# Patient Record
Sex: FEMALE | Race: WHITE | Employment: OTHER | URBAN - METROPOLITAN AREA
[De-identification: names, ages, dates, MRNs, and addresses within clinical notes are randomized per-mention and may not be internally consistent; named-entity substitution may affect disease eponyms.]

---

## 2017-06-05 ENCOUNTER — GENERIC CONVERSION - ENCOUNTER (OUTPATIENT)
Dept: OTHER | Facility: OTHER | Age: 62
End: 2017-06-05

## 2017-07-20 ENCOUNTER — GENERIC CONVERSION - ENCOUNTER (OUTPATIENT)
Dept: OTHER | Facility: OTHER | Age: 62
End: 2017-07-20

## 2017-08-09 ENCOUNTER — ALLSCRIPTS OFFICE VISIT (OUTPATIENT)
Dept: OTHER | Facility: OTHER | Age: 62
End: 2017-08-09

## 2018-01-14 VITALS
SYSTOLIC BLOOD PRESSURE: 142 MMHG | TEMPERATURE: 98.2 F | HEIGHT: 67 IN | WEIGHT: 262 LBS | DIASTOLIC BLOOD PRESSURE: 82 MMHG | HEART RATE: 80 BPM | RESPIRATION RATE: 18 BRPM | BODY MASS INDEX: 41.12 KG/M2

## 2018-05-26 ENCOUNTER — OFFICE VISIT (OUTPATIENT)
Dept: URGENT CARE | Facility: CLINIC | Age: 63
End: 2018-05-26
Payer: COMMERCIAL

## 2018-05-26 VITALS
SYSTOLIC BLOOD PRESSURE: 135 MMHG | OXYGEN SATURATION: 96 % | BODY MASS INDEX: 39.7 KG/M2 | HEART RATE: 78 BPM | WEIGHT: 247 LBS | HEIGHT: 66 IN | DIASTOLIC BLOOD PRESSURE: 79 MMHG | RESPIRATION RATE: 16 BRPM | TEMPERATURE: 100 F

## 2018-05-26 DIAGNOSIS — J20.9 ACUTE BRONCHITIS, UNSPECIFIED ORGANISM: Primary | ICD-10-CM

## 2018-05-26 PROCEDURE — 99283 EMERGENCY DEPT VISIT LOW MDM: CPT | Performed by: PREVENTIVE MEDICINE

## 2018-05-26 PROCEDURE — G0382 LEV 3 HOSP TYPE B ED VISIT: HCPCS | Performed by: PREVENTIVE MEDICINE

## 2018-05-26 RX ORDER — ATENOLOL 50 MG/1
50 TABLET ORAL
COMMUNITY

## 2018-05-26 RX ORDER — PROMETHAZINE HYDROCHLORIDE AND CODEINE PHOSPHATE 6.25; 1 MG/5ML; MG/5ML
5 SYRUP ORAL EVERY 4 HOURS PRN
Qty: 120 ML | Refills: 0 | Status: SHIPPED | OUTPATIENT
Start: 2018-05-26

## 2018-05-26 RX ORDER — DOXYCYCLINE 100 MG/1
100 TABLET ORAL 2 TIMES DAILY
Qty: 10 TABLET | Refills: 0 | Status: SHIPPED | OUTPATIENT
Start: 2018-05-26 | End: 2018-05-31

## 2018-05-26 RX ORDER — NICOTINE POLACRILEX 2 MG
GUM BUCCAL
COMMUNITY

## 2018-05-26 RX ORDER — OMEPRAZOLE 20 MG/1
CAPSULE, DELAYED RELEASE ORAL
COMMUNITY
Start: 2017-04-02

## 2018-05-26 RX ORDER — CYCLOBENZAPRINE HCL 10 MG
TABLET ORAL
COMMUNITY
Start: 2017-02-22

## 2018-05-26 RX ORDER — CETIRIZINE HYDROCHLORIDE 10 MG/1
TABLET ORAL
COMMUNITY
Start: 2017-02-23

## 2018-05-26 RX ORDER — ALBUTEROL SULFATE 90 UG/1
AEROSOL, METERED RESPIRATORY (INHALATION)
COMMUNITY
Start: 2017-02-23

## 2018-05-26 RX ORDER — ALBUTEROL SULFATE 2.5 MG/3ML
SOLUTION RESPIRATORY (INHALATION)
COMMUNITY
Start: 2017-02-22 | End: 2019-08-25 | Stop reason: SDUPTHER

## 2018-05-26 RX ORDER — LEVOTHYROXINE SODIUM 0.2 MG/1
TABLET ORAL
COMMUNITY
Start: 2017-02-20

## 2018-05-26 RX ORDER — PREGABALIN 100 MG/1
CAPSULE ORAL 3 TIMES DAILY
COMMUNITY
Start: 2017-03-11

## 2018-05-26 RX ORDER — AMITRIPTYLINE HYDROCHLORIDE 50 MG/1
TABLET, FILM COATED ORAL
COMMUNITY
Start: 2017-02-27

## 2018-05-26 RX ORDER — PROCHLORPERAZINE MALEATE 10 MG
TABLET ORAL
COMMUNITY
Start: 2017-05-17

## 2018-05-26 RX ORDER — EZETIMIBE AND SIMVASTATIN 10; 40 MG/1; MG/1
TABLET ORAL
COMMUNITY
Start: 2017-03-21

## 2018-05-26 RX ORDER — FLUTICASONE PROPIONATE 220 UG/1
AEROSOL, METERED RESPIRATORY (INHALATION)
COMMUNITY
Start: 2017-08-02

## 2018-05-26 RX ORDER — FLUTICASONE PROPIONATE 50 MCG
SPRAY, SUSPENSION (ML) NASAL
COMMUNITY
Start: 2017-05-11

## 2018-05-26 RX ORDER — ATENOLOL AND CHLORTHALIDONE TABLET 50; 25 MG/1; MG/1
TABLET ORAL
COMMUNITY
Start: 2017-04-02

## 2018-05-26 RX ORDER — SUMATRIPTAN 100 MG/1
TABLET, FILM COATED ORAL
COMMUNITY
Start: 2017-04-11

## 2018-05-26 NOTE — PATIENT INSTRUCTIONS
Increase fluids  Codeine cough medicine as needed to suppress harsh cough    If you're not improving in the next 2-4 days you must be recheck

## 2018-05-26 NOTE — PROGRESS NOTES
[facility]        NAME: Elba Alcantar is a 63 y o  female  : 1955    MRN: [id number]  DATE: May 26, 2018  TIME: 4:00 PM    Assessment and Plan   Acute bronchitis, unspecified organism [J20 9]  1  Acute bronchitis, unspecified organism           Patient Instructions       Follow up with PCP in 3-5 days  Proceed to  ER if symptoms worsen  Chief Complaint     Chief Complaint   Patient presents with    Cold Like Symptoms     Started  with sneezing, coughing, post nasal drip  Saw PCP on wednesday, not given anything  Now c/o chest tightness, wheezing, thick green phlegm with cough  Hx)asthma         History of Present Illness       Harsh cough, congestion, low-grade fever  Review of Systems   Review of Systems   Constitutional: Positive for fever  HENT: Positive for congestion  Respiratory: Positive for cough            Current Medications       Current Outpatient Prescriptions:     albuterol (2 5 mg/3 mL) 0 083 % nebulizer solution, Inhale, Disp: , Rfl:     albuterol (VENTOLIN HFA) 90 mcg/act inhaler, Inhale, Disp: , Rfl:     amitriptyline (ELAVIL) 50 mg tablet, Take by mouth, Disp: , Rfl:     atenolol-chlorthalidone (TENORETIC) 50-25 mg per tablet, Take by mouth, Disp: , Rfl:     cetirizine (ZyrTEC) 10 mg tablet, Take by mouth, Disp: , Rfl:     Cholecalciferol (VITAMIN D3) 1000 UNIT/SPRAY LIQD, Take by mouth, Disp: , Rfl:     cyclobenzaprine (FLEXERIL) 10 mg tablet, Take by mouth, Disp: , Rfl:     ezetimibe-simvastatin (VYTORIN) 10-40 mg per tablet, Take by mouth, Disp: , Rfl:     fluticasone (FLONASE) 50 mcg/act nasal spray, into each nostril, Disp: , Rfl:     fluticasone (FLOVENT HFA) 220 mcg/act inhaler, Inhale, Disp: , Rfl:     levothyroxine 200 mcg tablet, Take by mouth, Disp: , Rfl:     omeprazole (PriLOSEC) 20 mg delayed release capsule, Take by mouth, Disp: , Rfl:     pregabalin (LYRICA) 100 mg capsule, Take by mouth, Disp: , Rfl:     prochlorperazine (COMPAZINE) 10 mg tablet, Take by mouth, Disp: , Rfl:     SUMAtriptan (IMITREX) 100 mg tablet, Take by mouth, Disp: , Rfl:     atenolol (TENORMIN) 50 mg tablet, Take 50 mg by mouth, Disp: , Rfl:     Biotin 1 MG CAPS, Take by mouth, Disp: , Rfl:     Current Allergies     Allergies as of 05/26/2018 - Reviewed 05/26/2018   Allergen Reaction Noted    Seasonal ic [cholestatin]  05/26/2018            The following portions of the patient's history were reviewed and updated as appropriate: allergies, current medications, past family history, past medical history, past social history, past surgical history and problem list      No past medical history on file  No past surgical history on file  No family history on file  Medications have been verified  Objective   /79   Pulse 78   Temp 100 °F (37 8 °C)   Resp 16   Ht 5' 6" (1 676 m)   Wt 112 kg (247 lb)   SpO2 96%   BMI 39 87 kg/m²        Physical Exam     Physical Exam   HENT:   Right Ear: External ear normal    Left Ear: External ear normal    Mouth/Throat: Oropharynx is clear and moist    Cardiovascular: Normal heart sounds  Pulmonary/Chest: Breath sounds normal  No respiratory distress  She has no wheezes  She has no rales  Lymphadenopathy:     She has no cervical adenopathy

## 2018-05-29 ENCOUNTER — OFFICE VISIT (OUTPATIENT)
Dept: URGENT CARE | Facility: CLINIC | Age: 63
End: 2018-05-29
Payer: COMMERCIAL

## 2018-05-29 VITALS
DIASTOLIC BLOOD PRESSURE: 86 MMHG | BODY MASS INDEX: 39.21 KG/M2 | TEMPERATURE: 98.6 F | SYSTOLIC BLOOD PRESSURE: 140 MMHG | HEART RATE: 85 BPM | RESPIRATION RATE: 16 BRPM | WEIGHT: 244 LBS | HEIGHT: 66 IN

## 2018-05-29 DIAGNOSIS — N30.01 ACUTE CYSTITIS WITH HEMATURIA: Primary | ICD-10-CM

## 2018-05-29 DIAGNOSIS — R05.9 COUGH: ICD-10-CM

## 2018-05-29 PROCEDURE — 99283 EMERGENCY DEPT VISIT LOW MDM: CPT | Performed by: PREVENTIVE MEDICINE

## 2018-05-29 PROCEDURE — G0382 LEV 3 HOSP TYPE B ED VISIT: HCPCS | Performed by: PREVENTIVE MEDICINE

## 2018-05-29 RX ORDER — PROMETHAZINE HYDROCHLORIDE AND CODEINE PHOSPHATE 6.25; 1 MG/5ML; MG/5ML
5 SYRUP ORAL EVERY 4 HOURS PRN
Qty: 120 ML | Refills: 0 | Status: SHIPPED | OUTPATIENT
Start: 2018-05-29 | End: 2018-06-03

## 2018-05-29 RX ORDER — CIPROFLOXACIN 250 MG/1
500 TABLET, FILM COATED ORAL EVERY 12 HOURS SCHEDULED
Qty: 10 TABLET | Refills: 0 | Status: SHIPPED | OUTPATIENT
Start: 2018-05-29 | End: 2018-06-03

## 2018-05-29 NOTE — PROGRESS NOTES
[facility]        NAME: Karen Sewell is a 63 y o  female  : 1955    MRN: [id number]  DATE: May 29, 2018  TIME: 5:47 PM    Assessment and Plan   Acute cystitis with hematuria [N30 01]  1  Acute cystitis with hematuria  ciprofloxacin (CIPRO) 250 mg tablet   2  Cough  promethazine-codeine (PHENERGAN WITH CODEINE) 6 25-10 mg/5 mL syrup         Patient Instructions       Follow up with PCP in 3-5 days  Proceed to  ER if symptoms worsen  Chief Complaint     Chief Complaint   Patient presents with    Urinary Frequency     pt has urinary frequency, urgency, and burning sensation since yesterday  pt had uti recently  pt still taking doxycycline from saturday's visit O2          History of Present Illness       Burning and frequency of urination times 2-3 days  Note history of several incidences of UTIs in the past   Also, continuing increased cough particularly at night  Review of Systems   Review of Systems   Genitourinary: Positive for dysuria, frequency and hematuria           Current Medications       Current Outpatient Prescriptions:     albuterol (2 5 mg/3 mL) 0 083 % nebulizer solution, Inhale, Disp: , Rfl:     albuterol (VENTOLIN HFA) 90 mcg/act inhaler, Inhale, Disp: , Rfl:     amitriptyline (ELAVIL) 50 mg tablet, Take by mouth, Disp: , Rfl:     atenolol-chlorthalidone (TENORETIC) 50-25 mg per tablet, Take by mouth, Disp: , Rfl:     Biotin 1 MG CAPS, Take by mouth, Disp: , Rfl:     cetirizine (ZyrTEC) 10 mg tablet, Take by mouth, Disp: , Rfl:     Cholecalciferol (VITAMIN D3) 1000 UNIT/SPRAY LIQD, Take by mouth, Disp: , Rfl:     cyclobenzaprine (FLEXERIL) 10 mg tablet, Take by mouth, Disp: , Rfl:     doxycycline (ADOXA) 100 MG tablet, Take 1 tablet (100 mg total) by mouth 2 (two) times a day for 5 days, Disp: 10 tablet, Rfl: 0    ezetimibe-simvastatin (VYTORIN) 10-40 mg per tablet, Take by mouth, Disp: , Rfl:     fluticasone (FLONASE) 50 mcg/act nasal spray, into each nostril, Disp: , Rfl:     fluticasone (FLOVENT HFA) 220 mcg/act inhaler, Inhale, Disp: , Rfl:     levothyroxine 200 mcg tablet, Take by mouth, Disp: , Rfl:     omeprazole (PriLOSEC) 20 mg delayed release capsule, Take by mouth, Disp: , Rfl:     pregabalin (LYRICA) 100 mg capsule, Take by mouth, Disp: , Rfl:     prochlorperazine (COMPAZINE) 10 mg tablet, Take by mouth, Disp: , Rfl:     promethazine-codeine (PHENERGAN WITH CODEINE) 6 25-10 mg/5 mL syrup, Take 5 mL by mouth every 4 (four) hours as needed for cough, Disp: 120 mL, Rfl: 0    SUMAtriptan (IMITREX) 100 mg tablet, Take by mouth, Disp: , Rfl:     atenolol (TENORMIN) 50 mg tablet, Take 50 mg by mouth, Disp: , Rfl:     ciprofloxacin (CIPRO) 250 mg tablet, Take 2 tablets (500 mg total) by mouth every 12 (twelve) hours for 5 days, Disp: 10 tablet, Rfl: 0    promethazine-codeine (PHENERGAN WITH CODEINE) 6 25-10 mg/5 mL syrup, Take 5 mL by mouth every 4 (four) hours as needed for cough for up to 5 days, Disp: 120 mL, Rfl: 0    Current Allergies     Allergies as of 05/29/2018 - Reviewed 05/29/2018   Allergen Reaction Noted    Seasonal ic [cholestatin]  05/26/2018            The following portions of the patient's history were reviewed and updated as appropriate: allergies, current medications, past family history, past medical history, past social history, past surgical history and problem list      Past Medical History:   Diagnosis Date    Acid reflux     Asthma     Back pain     Hypertension     Hypothyroidism     Nerve pain     Seasonal allergies        History reviewed  No pertinent surgical history  No family history on file  Medications have been verified          Objective   /86   Pulse 85   Temp 98 6 °F (37 °C)   Resp 16   Ht 5' 6" (1 676 m)   Wt 111 kg (244 lb)   BMI 39 38 kg/m²        Physical Exam     Physical Exam   Genitourinary:   Genitourinary Comments: No CVA tenderness to percussion     Urinalysis shows large amounts of leukocytes and red cells

## 2018-05-29 NOTE — PATIENT INSTRUCTIONS
Call on Thursday for the results of the urine culture  If he developed fever and back pain you must be recheck immediately    If you're getting persistent repeated urinary infections you should see a urologist

## 2018-06-01 LAB
BACTERIA UR CULT: ABNORMAL
Lab: ABNORMAL
SL AMB ANTIMICROBIAL SUSCEPTIBILITY: ABNORMAL

## 2019-02-26 ENCOUNTER — OFFICE VISIT (OUTPATIENT)
Dept: URGENT CARE | Facility: CLINIC | Age: 64
End: 2019-02-26
Payer: COMMERCIAL

## 2019-02-26 VITALS
TEMPERATURE: 100.1 F | BODY MASS INDEX: 39.53 KG/M2 | RESPIRATION RATE: 16 BRPM | HEIGHT: 66 IN | SYSTOLIC BLOOD PRESSURE: 152 MMHG | DIASTOLIC BLOOD PRESSURE: 88 MMHG | OXYGEN SATURATION: 97 % | WEIGHT: 246 LBS | HEART RATE: 88 BPM

## 2019-02-26 DIAGNOSIS — J40 BRONCHITIS: Primary | ICD-10-CM

## 2019-02-26 PROCEDURE — G0382 LEV 3 HOSP TYPE B ED VISIT: HCPCS | Performed by: PHYSICIAN ASSISTANT

## 2019-02-26 PROCEDURE — 99283 EMERGENCY DEPT VISIT LOW MDM: CPT | Performed by: PHYSICIAN ASSISTANT

## 2019-02-26 RX ORDER — BIOTIN 5 MG
TABLET ORAL
COMMUNITY

## 2019-02-26 RX ORDER — AZITHROMYCIN 250 MG/1
TABLET, FILM COATED ORAL
Qty: 6 TABLET | Refills: 0 | Status: SHIPPED | OUTPATIENT
Start: 2019-02-26 | End: 2019-03-03

## 2019-02-26 RX ORDER — ALBUTEROL SULFATE 2.5 MG/3ML
2.5 SOLUTION RESPIRATORY (INHALATION) EVERY 6 HOURS PRN
Qty: 15 VIAL | Refills: 0 | Status: SHIPPED | OUTPATIENT
Start: 2019-02-26

## 2019-02-27 NOTE — PATIENT INSTRUCTIONS
Take azithromycin as directed  Continue over-the-counter cough medications as needed  Tylenol for fever  Increased fluids and rest  If no improvement in 3-4 days follow-up with PCP  If anything changes or worsens follow-up sooner or go the ER

## 2019-02-27 NOTE — PROGRESS NOTES
NAME: Kristine Cruz is a 66 y o  female  : 1955    MRN: [id number]      Assessment and Plan   Bronchitis [J40]  1  Bronchitis  azithromycin (ZITHROMAX) 250 mg tablet    dextromethorphan-guaifenesin (MUCINEX DM)  MG per 12 hr tablet    albuterol (2 5 mg/3 mL) 0 083 % nebulizer solution           Patient Instructions   Patient Instructions   Take azithromycin as directed  Continue over-the-counter cough medications as needed  Tylenol for fever  Increased fluids and rest  If no improvement in 3-4 days follow-up with PCP  If anything changes or worsens follow-up sooner or go the ER    Proceed to ER if symptoms worsen  Chief Complaint     Chief Complaint   Patient presents with    Cold Like Symptoms     2 weeks, fever         History of Present Illness   Patient with past medical history of hypertension and COPD Complaining of 2 week history of cough and congestion  She started 2 weeks ago with upper respiratory infection and states worsening since  She states her sputum has now turned to a thick consistency and she complains of chest tightness  Denies shortness of breath, dyspnea, chest pain  She states she also has some sinus pain and pressure and runny nose  Reports she has low-grade fevers now T-max 100 1°  She states she has been taking cold and flu medication and Zyrtec without any relief  Review of Systems   Review of Systems   Constitutional: Positive for fever  Negative for chills  HENT: Positive for congestion, rhinorrhea, sinus pressure and sinus pain  Negative for ear pain and sore throat  Respiratory: Positive for cough, chest tightness and wheezing  Negative for shortness of breath and stridor  Cardiovascular: Negative for chest pain and palpitations           Current Medications       Current Outpatient Medications:     albuterol (VENTOLIN HFA) 90 mcg/act inhaler, Inhale, Disp: , Rfl:     atenolol (TENORMIN) 50 mg tablet, Take 50 mg by mouth, Disp: , Rfl:     Biotin 1 MG CAPS, Take by mouth, Disp: , Rfl:     cetirizine (ZyrTEC) 10 mg tablet, Take by mouth, Disp: , Rfl:     Cholecalciferol (VITAMIN D3) 1000 UNIT/SPRAY LIQD, Take by mouth, Disp: , Rfl:     cyclobenzaprine (FLEXERIL) 10 mg tablet, Take by mouth, Disp: , Rfl:     ezetimibe-simvastatin (VYTORIN) 10-40 mg per tablet, Take by mouth, Disp: , Rfl:     fluticasone (FLONASE) 50 mcg/act nasal spray, into each nostril, Disp: , Rfl:     fluticasone (FLOVENT HFA) 220 mcg/act inhaler, Inhale, Disp: , Rfl:     omeprazole (PriLOSEC) 20 mg delayed release capsule, Take by mouth, Disp: , Rfl:     prochlorperazine (COMPAZINE) 10 mg tablet, Take by mouth, Disp: , Rfl:     SUMAtriptan (IMITREX) 100 mg tablet, Take by mouth, Disp: , Rfl:     albuterol (2 5 mg/3 mL) 0 083 % nebulizer solution, Inhale, Disp: , Rfl:     albuterol (2 5 mg/3 mL) 0 083 % nebulizer solution, Take 1 vial (2 5 mg total) by nebulization every 6 (six) hours as needed for wheezing or shortness of breath, Disp: 15 vial, Rfl: 0    ALBUTEROL IN, Inhale 2 puffs every 6 (six) hours as needed, Disp: , Rfl:     amitriptyline (ELAVIL) 50 mg tablet, Take by mouth, Disp: , Rfl:     atenolol-chlorthalidone (TENORETIC) 50-25 mg per tablet, Take by mouth, Disp: , Rfl:     azithromycin (ZITHROMAX) 250 mg tablet, Take 2 tablets today then 1 tablet daily x 4 days, Disp: 6 tablet, Rfl: 0    Biotin 5 MG TABS, Take by mouth, Disp: , Rfl:     dextromethorphan-guaifenesin (MUCINEX DM)  MG per 12 hr tablet, Take 1 tablet by mouth every 12 (twelve) hours, Disp: 12 tablet, Rfl: 0    levothyroxine 200 mcg tablet, Take by mouth, Disp: , Rfl:     pregabalin (LYRICA) 100 mg capsule, Take by mouth, Disp: , Rfl:     promethazine-codeine (PHENERGAN WITH CODEINE) 6 25-10 mg/5 mL syrup, Take 5 mL by mouth every 4 (four) hours as needed for cough (Patient not taking: Reported on 2/26/2019), Disp: 120 mL, Rfl: 0    Current Allergies     Allergies as of 02/26/2019 - Reviewed 02/26/2019   Allergen Reaction Noted    Seasonal ic [cholestatin]  05/26/2018              Past Medical History:   Diagnosis Date    Acid reflux     Asthma     Back pain     Hypertension     Hypothyroidism     Nerve pain     Seasonal allergies        History reviewed  No pertinent surgical history  History reviewed  No pertinent family history  Medications have been verified  The following portions of the patient's history were reviewed and updated as appropriate: allergies, current medications, past family history, past medical history, past social history, past surgical history and problem list     Objective   /88   Pulse 88   Temp 100 1 °F (37 8 °C)   Resp 16   Ht 5' 6" (1 676 m)   Wt 112 kg (246 lb)   SpO2 97%   BMI 39 71 kg/m²      Physical Exam     Physical Exam   Constitutional: She appears well-developed and well-nourished  No distress  HENT:   TMs clear bilaterally without erythema or bulging  Nasal mucosa without erythema or edema  Oropharynx is clear without erythema, edema, exudates or tonsillar hypertrophy  +PND   Cardiovascular: Normal rate, regular rhythm and normal heart sounds  Pulmonary/Chest: Effort normal  No stridor  No respiratory distress  She has wheezes (Fine bilateral expiratory wheezes to the upper lung lobes  No rhonchi or rales  )  She has no rales  Lymphadenopathy:     She has no cervical adenopathy  Skin: She is not diaphoretic

## 2019-08-15 ENCOUNTER — OFFICE VISIT (OUTPATIENT)
Dept: URGENT CARE | Facility: CLINIC | Age: 64
End: 2019-08-15
Payer: COMMERCIAL

## 2019-08-15 VITALS
RESPIRATION RATE: 16 BRPM | SYSTOLIC BLOOD PRESSURE: 130 MMHG | BODY MASS INDEX: 41.65 KG/M2 | DIASTOLIC BLOOD PRESSURE: 70 MMHG | HEIGHT: 65 IN | HEART RATE: 86 BPM | TEMPERATURE: 102.5 F | WEIGHT: 250 LBS

## 2019-08-15 DIAGNOSIS — J45.21 MILD INTERMITTENT ASTHMA WITH EXACERBATION: Primary | ICD-10-CM

## 2019-08-15 DIAGNOSIS — J30.1 ALLERGIC RHINITIS DUE TO POLLEN, UNSPECIFIED SEASONALITY: ICD-10-CM

## 2019-08-15 PROCEDURE — 99283 EMERGENCY DEPT VISIT LOW MDM: CPT | Performed by: FAMILY MEDICINE

## 2019-08-15 PROCEDURE — 96372 THER/PROPH/DIAG INJ SC/IM: CPT | Performed by: FAMILY MEDICINE

## 2019-08-15 PROCEDURE — G0382 LEV 3 HOSP TYPE B ED VISIT: HCPCS | Performed by: FAMILY MEDICINE

## 2019-08-15 RX ORDER — DEXTROMETHORPHAN HYDROBROMIDE AND PROMETHAZINE HYDROCHLORIDE 15; 6.25 MG/5ML; MG/5ML
5 SOLUTION ORAL 4 TIMES DAILY PRN
Qty: 120 ML | Refills: 0 | Status: SHIPPED | OUTPATIENT
Start: 2019-08-15

## 2019-08-15 RX ORDER — AZITHROMYCIN 250 MG/1
TABLET, FILM COATED ORAL
Qty: 6 TABLET | Refills: 0 | Status: SHIPPED | OUTPATIENT
Start: 2019-08-15 | End: 2019-08-20

## 2019-08-15 RX ORDER — METHYLPREDNISOLONE SODIUM SUCCINATE 125 MG/2ML
125 INJECTION, POWDER, LYOPHILIZED, FOR SOLUTION INTRAMUSCULAR; INTRAVENOUS ONCE
Status: COMPLETED | OUTPATIENT
Start: 2019-08-15 | End: 2019-08-15

## 2019-08-15 RX ORDER — PREDNISONE 10 MG/1
TABLET ORAL
Qty: 10 TABLET | Refills: 0 | Status: SHIPPED | OUTPATIENT
Start: 2019-08-15

## 2019-08-15 RX ADMIN — METHYLPREDNISOLONE SODIUM SUCCINATE 125 MG: 125 INJECTION, POWDER, LYOPHILIZED, FOR SOLUTION INTRAMUSCULAR; INTRAVENOUS at 19:21

## 2019-08-15 NOTE — PROGRESS NOTES
[facility]        NAME: Rajwinder Johnson is a 66 y o  female  : 1955    MRN: [id number]  DATE: August 15, 2019  TIME: 7:30 PM    Assessment and Plan   Mild intermittent asthma with exacerbation [J45 21]  1  Mild intermittent asthma with exacerbation  methylPREDNISolone sodium succinate (Solu-MEDROL) injection 125 mg    predniSONE 10 mg tablet    Promethazine-DM (PHENERGAN-DM) 6 25-15 mg/5 mL oral syrup    azithromycin (ZITHROMAX) 250 mg tablet   2  Allergic rhinitis due to pollen, unspecified seasonality  predniSONE 10 mg tablet         Patient Instructions   Patient Instructions   Solu-Medrol 125 mg IM given in care now by nurse  Start brief prednisone taper tomorrow  Promethazine DM as needed for cough  Continue albuterol inhaler or nebulizer for cough  Azithromycin as directed  Follow-up PCP in 3-5 days        Proceed to  ER if symptoms worsen  Chief Complaint     Chief Complaint   Patient presents with    Cough     x4 days, cough, congestion, mucus turning green, O2-95%         History of Present Illness       Patient presents with 3 day history of sinus congestion and nonproductive cough which she feels is becoming worse  She does have a history of seasonal allergies and mild intermittent asthma for which she uses an albuterol inhaler  She does also use Flonase and Zyrtec  She denies chest tightness shortness of breath or wheezing  No fevers no chills prior to today, however she does have a temp of 102 5° now  She has been using her albuterol inhaler which helps for a short time, she also has a nebulizer at home  Review of Systems   Review of Systems   Constitutional: Negative  HENT: Positive for congestion and rhinorrhea  Negative for sneezing and sore throat  Eyes: Negative  Respiratory: Positive for cough  Negative for chest tightness, shortness of breath and wheezing  Cardiovascular: Negative  Musculoskeletal: Negative            Current Medications Current Outpatient Medications:     albuterol (2 5 mg/3 mL) 0 083 % nebulizer solution, Take 1 vial (2 5 mg total) by nebulization every 6 (six) hours as needed for wheezing or shortness of breath, Disp: 15 vial, Rfl: 0    albuterol (VENTOLIN HFA) 90 mcg/act inhaler, Inhale, Disp: , Rfl:     ALBUTEROL IN, Inhale 2 puffs every 6 (six) hours as needed, Disp: , Rfl:     atenolol-chlorthalidone (TENORETIC) 50-25 mg per tablet, Take by mouth, Disp: , Rfl:     Biotin 5 MG TABS, Take by mouth, Disp: , Rfl:     cetirizine (ZyrTEC) 10 mg tablet, Take by mouth, Disp: , Rfl:     Cholecalciferol (VITAMIN D3) 1000 UNIT/SPRAY LIQD, Take by mouth, Disp: , Rfl:     cyclobenzaprine (FLEXERIL) 10 mg tablet, Take by mouth, Disp: , Rfl:     ezetimibe-simvastatin (VYTORIN) 10-40 mg per tablet, Take by mouth, Disp: , Rfl:     fluticasone (FLONASE) 50 mcg/act nasal spray, into each nostril, Disp: , Rfl:     fluticasone (FLOVENT HFA) 220 mcg/act inhaler, Inhale, Disp: , Rfl:     omeprazole (PriLOSEC) 20 mg delayed release capsule, Take by mouth, Disp: , Rfl:     prochlorperazine (COMPAZINE) 10 mg tablet, Take by mouth, Disp: , Rfl:     SUMAtriptan (IMITREX) 100 mg tablet, Take by mouth, Disp: , Rfl:     albuterol (2 5 mg/3 mL) 0 083 % nebulizer solution, Inhale, Disp: , Rfl:     amitriptyline (ELAVIL) 50 mg tablet, Take by mouth, Disp: , Rfl:     atenolol (TENORMIN) 50 mg tablet, Take 50 mg by mouth, Disp: , Rfl:     azithromycin (ZITHROMAX) 250 mg tablet, 2 tablets today then 1 tablet for the next 4 days, Disp: 6 tablet, Rfl: 0    Biotin 1 MG CAPS, Take by mouth, Disp: , Rfl:     dextromethorphan-guaifenesin (MUCINEX DM)  MG per 12 hr tablet, Take 1 tablet by mouth every 12 (twelve) hours (Patient not taking: Reported on 8/15/2019), Disp: 12 tablet, Rfl: 0    levothyroxine 200 mcg tablet, Take by mouth, Disp: , Rfl:     predniSONE 10 mg tablet, Take 4 tablets starting 8/16, then 3 tablets, then 2 tablets, then 1 tablet, Disp: 10 tablet, Rfl: 0    pregabalin (LYRICA) 100 mg capsule, Take by mouth, Disp: , Rfl:     promethazine-codeine (PHENERGAN WITH CODEINE) 6 25-10 mg/5 mL syrup, Take 5 mL by mouth every 4 (four) hours as needed for cough (Patient not taking: Reported on 2/26/2019), Disp: 120 mL, Rfl: 0    Promethazine-DM (PHENERGAN-DM) 6 25-15 mg/5 mL oral syrup, Take 5 mL by mouth 4 (four) times a day as needed for cough, Disp: 120 mL, Rfl: 0  No current facility-administered medications for this visit  Current Allergies     Allergies as of 08/15/2019 - Reviewed 08/15/2019   Allergen Reaction Noted    Seasonal ic [cholestatin]  05/26/2018            The following portions of the patient's history were reviewed and updated as appropriate: allergies, current medications, past family history, past medical history, past social history, past surgical history and problem list      Past Medical History:   Diagnosis Date    Acid reflux     Asthma     Back pain     Hypertension     Hypothyroidism     Nerve pain     Seasonal allergies        History reviewed  No pertinent surgical history  No family history on file  Medications have been verified  Objective   /70   Pulse 86   Temp (!) 102 5 °F (39 2 °C)   Resp 16   Ht 5' 5" (1 651 m)   Wt 113 kg (250 lb)   BMI 41 60 kg/m²        Physical Exam     Physical Exam   Constitutional: She appears well-developed and well-nourished  HENT:   Right Ear: External ear normal    Left Ear: External ear normal    Mouth/Throat: No oropharyngeal exudate  Intranasal mucosal mild erythema edema and clear rhinorrhea, mild PND TM clear bilaterally   Eyes: Conjunctivae are normal    Neck: Neck supple  Cardiovascular: Normal rate, regular rhythm and normal heart sounds  Pulmonary/Chest: Effort normal and breath sounds normal  No respiratory distress  She has no wheezes  Lymphadenopathy:     She has no cervical adenopathy

## 2019-08-15 NOTE — PATIENT INSTRUCTIONS
Solu-Medrol 125 mg IM given in care now by nurse  Start brief prednisone taper tomorrow  Promethazine DM as needed for cough  Continue albuterol inhaler or nebulizer for cough  Azithromycin as directed  Follow-up PCP in 3-5 days

## 2019-08-16 ENCOUNTER — OFFICE VISIT (OUTPATIENT)
Dept: URGENT CARE | Facility: CLINIC | Age: 64
End: 2019-08-16
Payer: COMMERCIAL

## 2019-08-16 VITALS
DIASTOLIC BLOOD PRESSURE: 82 MMHG | WEIGHT: 250 LBS | HEIGHT: 65 IN | TEMPERATURE: 97.6 F | BODY MASS INDEX: 41.65 KG/M2 | SYSTOLIC BLOOD PRESSURE: 138 MMHG | OXYGEN SATURATION: 97 % | HEART RATE: 79 BPM | RESPIRATION RATE: 18 BRPM

## 2019-08-16 DIAGNOSIS — R31.9 URINARY TRACT INFECTION WITH HEMATURIA, SITE UNSPECIFIED: Primary | ICD-10-CM

## 2019-08-16 DIAGNOSIS — R30.0 BURNING WITH URINATION: ICD-10-CM

## 2019-08-16 DIAGNOSIS — N39.0 URINARY TRACT INFECTION WITH HEMATURIA, SITE UNSPECIFIED: Primary | ICD-10-CM

## 2019-08-16 LAB
SL AMB  POCT GLUCOSE, UA: NEGATIVE
SL AMB LEUKOCYTE ESTERASE,UA: ABNORMAL
SL AMB POCT BILIRUBIN,UA: NEGATIVE
SL AMB POCT BLOOD,UA: ABNORMAL
SL AMB POCT CLARITY,UA: ABNORMAL
SL AMB POCT COLOR,UA: ABNORMAL
SL AMB POCT KETONES,UA: NEGATIVE
SL AMB POCT NITRITE,UA: NEGATIVE
SL AMB POCT PH,UA: 6.5
SL AMB POCT SPECIFIC GRAVITY,UA: 1.02
SL AMB POCT URINE PROTEIN: 30
SL AMB POCT UROBILINOGEN: 1

## 2019-08-16 PROCEDURE — 81002 URINALYSIS NONAUTO W/O SCOPE: CPT | Performed by: PHYSICIAN ASSISTANT

## 2019-08-16 PROCEDURE — G0382 LEV 3 HOSP TYPE B ED VISIT: HCPCS | Performed by: PHYSICIAN ASSISTANT

## 2019-08-16 PROCEDURE — 99283 EMERGENCY DEPT VISIT LOW MDM: CPT | Performed by: PHYSICIAN ASSISTANT

## 2019-08-16 RX ORDER — NITROFURANTOIN 25; 75 MG/1; MG/1
100 CAPSULE ORAL 2 TIMES DAILY
Qty: 10 CAPSULE | Refills: 0 | Status: SHIPPED | OUTPATIENT
Start: 2019-08-16 | End: 2019-08-21

## 2019-08-16 NOTE — PROGRESS NOTES
NAME: Grady Burton is a 66 y o  female  : 1955    MRN: [id number]      Assessment and Plan   Urinary tract infection with hematuria, site unspecified [N39 0, R31 9]  1  Urinary tract infection with hematuria, site unspecified  nitrofurantoin (MACROBID) 100 mg capsule   2  Burning with urination  POCT urine dip    Urine culture       Dip positive for large leukocytes, 30 protein and small blood  Will empirically treat    Patient Instructions     Patient Instructions   Macrobid as directed  Increase fluids and rest  Call in 2 days for culture results   If no improvement in 3-4 days f/u with PCP   If anything changes or worsens, f/u sooner     Proceed to ER if symptoms worsen  Chief Complaint     Chief Complaint   Patient presents with    Possible UTI     burning with urination; started today         History of Present Illness   Patient presents complaining of 1 day history of UTI symptoms  States she was seen here last night for cough and congestion and reports she is feeling a lot better but woke up this morning with burning and frequency  She denies any fever, chills, nausea, vomiting, back pain or abdominal pain  States that it burns when she pees, has lot of pressure and she is going a lot  Reports that her urine has a bad odor as well  Review of Systems   Review of Systems   Constitutional: Negative for chills and fever  Gastrointestinal: Negative for abdominal pain, nausea and vomiting  Genitourinary: Positive for dysuria, frequency and urgency  Negative for flank pain, hematuria, vaginal bleeding and vaginal discharge  Musculoskeletal: Negative for back pain           Current Medications       Current Outpatient Medications:     albuterol (2 5 mg/3 mL) 0 083 % nebulizer solution, Inhale, Disp: , Rfl:     albuterol (2 5 mg/3 mL) 0 083 % nebulizer solution, Take 1 vial (2 5 mg total) by nebulization every 6 (six) hours as needed for wheezing or shortness of breath, Disp: 15 vial, Rfl: 0    albuterol (VENTOLIN HFA) 90 mcg/act inhaler, Inhale, Disp: , Rfl:     ALBUTEROL IN, Inhale 2 puffs every 6 (six) hours as needed, Disp: , Rfl:     amitriptyline (ELAVIL) 50 mg tablet, Take by mouth, Disp: , Rfl:     atenolol (TENORMIN) 50 mg tablet, Take 50 mg by mouth, Disp: , Rfl:     atenolol-chlorthalidone (TENORETIC) 50-25 mg per tablet, Take by mouth, Disp: , Rfl:     azithromycin (ZITHROMAX) 250 mg tablet, 2 tablets today then 1 tablet for the next 4 days, Disp: 6 tablet, Rfl: 0    Biotin 1 MG CAPS, Take by mouth, Disp: , Rfl:     Biotin 5 MG TABS, Take by mouth, Disp: , Rfl:     cetirizine (ZyrTEC) 10 mg tablet, Take by mouth, Disp: , Rfl:     Cholecalciferol (VITAMIN D3) 1000 UNIT/SPRAY LIQD, Take by mouth, Disp: , Rfl:     cyclobenzaprine (FLEXERIL) 10 mg tablet, Take by mouth, Disp: , Rfl:     dextromethorphan-guaifenesin (MUCINEX DM)  MG per 12 hr tablet, Take 1 tablet by mouth every 12 (twelve) hours (Patient not taking: Reported on 8/15/2019), Disp: 12 tablet, Rfl: 0    ezetimibe-simvastatin (VYTORIN) 10-40 mg per tablet, Take by mouth, Disp: , Rfl:     fluticasone (FLONASE) 50 mcg/act nasal spray, into each nostril, Disp: , Rfl:     fluticasone (FLOVENT HFA) 220 mcg/act inhaler, Inhale, Disp: , Rfl:     levothyroxine 200 mcg tablet, Take by mouth, Disp: , Rfl:     nitrofurantoin (MACROBID) 100 mg capsule, Take 1 capsule (100 mg total) by mouth 2 (two) times a day for 5 days, Disp: 10 capsule, Rfl: 0    omeprazole (PriLOSEC) 20 mg delayed release capsule, Take by mouth, Disp: , Rfl:     predniSONE 10 mg tablet, Take 4 tablets starting 8/16, then 3 tablets, then 2 tablets, then 1 tablet, Disp: 10 tablet, Rfl: 0    pregabalin (LYRICA) 100 mg capsule, Take by mouth, Disp: , Rfl:     prochlorperazine (COMPAZINE) 10 mg tablet, Take by mouth, Disp: , Rfl:     promethazine-codeine (PHENERGAN WITH CODEINE) 6 25-10 mg/5 mL syrup, Take 5 mL by mouth every 4 (four) hours as needed for cough (Patient not taking: Reported on 2/26/2019), Disp: 120 mL, Rfl: 0    Promethazine-DM (PHENERGAN-DM) 6 25-15 mg/5 mL oral syrup, Take 5 mL by mouth 4 (four) times a day as needed for cough, Disp: 120 mL, Rfl: 0    SUMAtriptan (IMITREX) 100 mg tablet, Take by mouth, Disp: , Rfl:     Current Allergies     Allergies as of 08/16/2019 - Reviewed 08/16/2019   Allergen Reaction Noted    Seasonal ic [cholestatin]  05/26/2018              Past Medical History:   Diagnosis Date    Acid reflux     Asthma     Back pain     Hypertension     Hypothyroidism     Nerve pain     Seasonal allergies        History reviewed  No pertinent surgical history  No family history on file  Medications have been verified  The following portions of the patient's history were reviewed and updated as appropriate: allergies, current medications, past family history, past medical history, past social history, past surgical history and problem list     Objective   /82   Pulse 79   Temp 97 6 °F (36 4 °C) (Tympanic)   Resp 18   Ht 5' 5" (1 651 m)   Wt 113 kg (250 lb)   SpO2 97%   BMI 41 60 kg/m²      Physical Exam     Physical Exam   Constitutional: She appears well-developed and well-nourished  No distress  Abdominal:   No CVAT  No suprapubic tenderness or rigidity   Skin: She is not diaphoretic  Vitals reviewed

## 2019-08-21 LAB
BACTERIA UR CULT: ABNORMAL
Lab: ABNORMAL
SL AMB ANTIMICROBIAL SUSCEPTIBILITY: ABNORMAL

## 2019-08-25 ENCOUNTER — OFFICE VISIT (OUTPATIENT)
Dept: URGENT CARE | Facility: CLINIC | Age: 64
End: 2019-08-25
Payer: COMMERCIAL

## 2019-08-25 VITALS
OXYGEN SATURATION: 97 % | HEART RATE: 79 BPM | WEIGHT: 250 LBS | BODY MASS INDEX: 41.65 KG/M2 | RESPIRATION RATE: 16 BRPM | DIASTOLIC BLOOD PRESSURE: 84 MMHG | TEMPERATURE: 97.8 F | HEIGHT: 65 IN | SYSTOLIC BLOOD PRESSURE: 130 MMHG

## 2019-08-25 DIAGNOSIS — R20.2 TINGLING: ICD-10-CM

## 2019-08-25 DIAGNOSIS — N94.9 VAGINAL DISCOMFORT: Primary | ICD-10-CM

## 2019-08-25 LAB
SL AMB  POCT GLUCOSE, UA: NEGATIVE
SL AMB LEUKOCYTE ESTERASE,UA: ABNORMAL
SL AMB POCT BILIRUBIN,UA: NEGATIVE
SL AMB POCT BLOOD,UA: NEGATIVE
SL AMB POCT CLARITY,UA: CLEAR
SL AMB POCT COLOR,UA: YELLOW
SL AMB POCT KETONES,UA: NEGATIVE
SL AMB POCT NITRITE,UA: NEGATIVE
SL AMB POCT PH,UA: 7.5
SL AMB POCT SPECIFIC GRAVITY,UA: 1.01
SL AMB POCT URINE PROTEIN: NEGATIVE
SL AMB POCT UROBILINOGEN: 0.2

## 2019-08-25 PROCEDURE — 81002 URINALYSIS NONAUTO W/O SCOPE: CPT | Performed by: PHYSICIAN ASSISTANT

## 2019-08-25 PROCEDURE — G0382 LEV 3 HOSP TYPE B ED VISIT: HCPCS | Performed by: PHYSICIAN ASSISTANT

## 2019-08-25 PROCEDURE — 99283 EMERGENCY DEPT VISIT LOW MDM: CPT | Performed by: PHYSICIAN ASSISTANT

## 2019-08-25 NOTE — PATIENT INSTRUCTIONS
Urine dip only positive for moderate leukocytes, hematuria and nitrites have resolved, pt is asymptomatic, and culture showed susceptibility to macrobid making UTI unlikely  Vaginal "tingling" may have been due to mild yeast infection that resolved vs pudendal nerve irritation that has since resolved  F/u GYN if symptoms return

## 2019-08-25 NOTE — PROGRESS NOTES
[facility]        NAME: Grady Burton is a 66 y o  female  : 1955    MRN: [id number]  DATE: 2019  TIME: 4:57 PM    Assessment and Plan   Vaginal discomfort [N94 9]  1  Vaginal discomfort     2  Tingling  POCT urine dip         Patient Instructions     Patient Instructions   Urine dip only positive for moderate leukocytes, hematuria and nitrites have resolved, pt is asymptomatic, and culture showed susceptibility to macrobid making UTI unlikely  Vaginal "tingling" may have been due to mild yeast infection that resolved vs pudendal nerve irritation that has since resolved  F/u GYN if symptoms return      Follow up with PCP in 3-5 days  Proceed to  ER if symptoms worsen  Chief Complaint     Chief Complaint   Patient presents with    Possible UTI     pt reports finishing Macrobid however started to feel a "tingly" feeling inside her vagina, that has since passed  Pt saw PCP for an unrelated issue and mentioned what happened and PCP said that pt wasn't here for that, that she would need have another UA done and to see her gynecologist or go back to  for work up         History of Present Illness       65 y/o F presents c/o tingling sensation in the vagina that occurred several days ago  She was tx here for a UTI due to common UTI symptoms and a urine dip positive for leukocytes, blood as well as nitrites  She was treated with Macrobid which culture showed susceptibility to  Reports she took full course  However last week noticed that she had a tingling sensation in her vagina, states it was internal and not external   Denies any dysuria, urinary urgency or frequency, fever, chills, nausea, vomiting, vaginal discharge, vaginal itching    She reports that the tingling has since resolved but wanted to make sure that her urinary tract infection was resolved and not causing her symptoms  Urine dip today only positive for moderate leukocytes      Review of Systems   Review of Systems Constitutional: Negative for diaphoresis, fatigue and fever  HENT: Negative for congestion, ear pain, hearing loss, postnasal drip, rhinorrhea and sore throat  Eyes: Negative for pain, redness and visual disturbance  Respiratory: Negative for cough, shortness of breath and wheezing  Cardiovascular: Negative for chest pain, palpitations and leg swelling  Gastrointestinal: Negative for anal bleeding, constipation, diarrhea, nausea and vomiting  Endocrine: Negative for polydipsia and polyuria  Genitourinary: Negative for dysuria, flank pain and hematuria  Musculoskeletal: Negative for arthralgias, back pain, joint swelling and myalgias  Skin: Negative for pallor, rash and wound  Neurological: Positive for numbness  Negative for dizziness, syncope and headaches  Hematological: Negative for adenopathy  Does not bruise/bleed easily  Psychiatric/Behavioral: Negative for dysphoric mood and sleep disturbance  The patient is not nervous/anxious            Current Medications       Current Outpatient Medications:     albuterol (2 5 mg/3 mL) 0 083 % nebulizer solution, Take 1 vial (2 5 mg total) by nebulization every 6 (six) hours as needed for wheezing or shortness of breath, Disp: 15 vial, Rfl: 0    albuterol (VENTOLIN HFA) 90 mcg/act inhaler, Inhale, Disp: , Rfl:     ALBUTEROL IN, Inhale 2 puffs every 6 (six) hours as needed, Disp: , Rfl:     amitriptyline (ELAVIL) 50 mg tablet, Take by mouth, Disp: , Rfl:     atenolol (TENORMIN) 50 mg tablet, Take 50 mg by mouth, Disp: , Rfl:     atenolol-chlorthalidone (TENORETIC) 50-25 mg per tablet, Take by mouth, Disp: , Rfl:     Biotin 1 MG CAPS, Take by mouth, Disp: , Rfl:     Biotin 5 MG TABS, Take by mouth, Disp: , Rfl:     cetirizine (ZyrTEC) 10 mg tablet, Take by mouth, Disp: , Rfl:     Cholecalciferol (VITAMIN D3) 1000 UNIT/SPRAY LIQD, Take by mouth, Disp: , Rfl:     cyclobenzaprine (FLEXERIL) 10 mg tablet, Take by mouth, Disp: , Rfl:    dextromethorphan-guaifenesin (MUCINEX DM)  MG per 12 hr tablet, Take 1 tablet by mouth every 12 (twelve) hours (Patient not taking: Reported on 8/15/2019), Disp: 12 tablet, Rfl: 0    ezetimibe-simvastatin (VYTORIN) 10-40 mg per tablet, Take by mouth, Disp: , Rfl:     fluticasone (FLONASE) 50 mcg/act nasal spray, into each nostril, Disp: , Rfl:     fluticasone (FLOVENT HFA) 220 mcg/act inhaler, Inhale, Disp: , Rfl:     levothyroxine 200 mcg tablet, Take by mouth, Disp: , Rfl:     omeprazole (PriLOSEC) 20 mg delayed release capsule, Take by mouth, Disp: , Rfl:     predniSONE 10 mg tablet, Take 4 tablets starting 8/16, then 3 tablets, then 2 tablets, then 1 tablet, Disp: 10 tablet, Rfl: 0    pregabalin (LYRICA) 100 mg capsule, Take by mouth, Disp: , Rfl:     prochlorperazine (COMPAZINE) 10 mg tablet, Take by mouth, Disp: , Rfl:     promethazine-codeine (PHENERGAN WITH CODEINE) 6 25-10 mg/5 mL syrup, Take 5 mL by mouth every 4 (four) hours as needed for cough (Patient not taking: Reported on 2/26/2019), Disp: 120 mL, Rfl: 0    Promethazine-DM (PHENERGAN-DM) 6 25-15 mg/5 mL oral syrup, Take 5 mL by mouth 4 (four) times a day as needed for cough, Disp: 120 mL, Rfl: 0    SUMAtriptan (IMITREX) 100 mg tablet, Take by mouth, Disp: , Rfl:     Current Allergies     Allergies as of 08/25/2019 - Reviewed 08/25/2019   Allergen Reaction Noted    Seasonal ic [cholestatin]  05/26/2018            The following portions of the patient's history were reviewed and updated as appropriate: allergies, current medications, past family history, past medical history, past social history, past surgical history and problem list      Past Medical History:   Diagnosis Date    Acid reflux     Asthma     Back pain     Hypertension     Hypothyroidism     Nerve pain     Seasonal allergies     Urinary tract infection        History reviewed  No pertinent surgical history  History reviewed   No pertinent family history  Medications have been verified  Objective   /84   Pulse 79   Temp 97 8 °F (36 6 °C) (Tympanic)   Resp 16   Ht 5' 5" (1 651 m)   Wt 113 kg (250 lb)   SpO2 97%   BMI 41 60 kg/m²        Physical Exam     Physical Exam   Constitutional: She appears well-developed and well-nourished  No distress  Abdominal: Bowel sounds are normal  She exhibits no distension and no mass  There is no tenderness  There is no rebound and no guarding  No cva tenderness   Skin: She is not diaphoretic

## 2019-08-27 LAB
BACTERIA UR CULT: NORMAL
Lab: NORMAL

## 2019-08-30 ENCOUNTER — APPOINTMENT (OUTPATIENT)
Dept: RADIOLOGY | Facility: CLINIC | Age: 64
End: 2019-08-30
Payer: COMMERCIAL

## 2019-08-30 ENCOUNTER — OFFICE VISIT (OUTPATIENT)
Dept: URGENT CARE | Facility: CLINIC | Age: 64
End: 2019-08-30
Payer: COMMERCIAL

## 2019-08-30 VITALS
SYSTOLIC BLOOD PRESSURE: 140 MMHG | HEART RATE: 89 BPM | TEMPERATURE: 98.2 F | RESPIRATION RATE: 20 BRPM | HEIGHT: 65 IN | OXYGEN SATURATION: 97 % | WEIGHT: 250 LBS | BODY MASS INDEX: 41.65 KG/M2 | DIASTOLIC BLOOD PRESSURE: 90 MMHG

## 2019-08-30 DIAGNOSIS — H66.001 ACUTE SUPPURATIVE OTITIS MEDIA OF RIGHT EAR WITHOUT SPONTANEOUS RUPTURE OF TYMPANIC MEMBRANE, RECURRENCE NOT SPECIFIED: Primary | ICD-10-CM

## 2019-08-30 DIAGNOSIS — R09.89 CHEST CONGESTION: ICD-10-CM

## 2019-08-30 PROCEDURE — 99283 EMERGENCY DEPT VISIT LOW MDM: CPT | Performed by: PHYSICIAN ASSISTANT

## 2019-08-30 PROCEDURE — G0382 LEV 3 HOSP TYPE B ED VISIT: HCPCS | Performed by: PHYSICIAN ASSISTANT

## 2019-08-30 RX ORDER — AMOXICILLIN AND CLAVULANATE POTASSIUM 875; 125 MG/1; MG/1
1 TABLET, FILM COATED ORAL EVERY 12 HOURS SCHEDULED
Qty: 20 TABLET | Refills: 0 | Status: SHIPPED | OUTPATIENT
Start: 2019-08-30 | End: 2019-09-09

## 2019-08-30 NOTE — PROGRESS NOTES
NAME: Naty Lopez is a 66 y o  female  : 1955    MRN: [id number]      Assessment and Plan   Acute suppurative otitis media of right ear without spontaneous rupture of tympanic membrane, recurrence not specified [[id number] 001]  1  Acute suppurative otitis media of right ear without spontaneous rupture of tympanic membrane, recurrence not specified  amoxicillin-clavulanate (AUGMENTIN) 875-125 mg per tablet   2  Chest congestion  XR chest pa & lateral      discussed with patient how she appear to started with a viral infection but maybe starting with an ear infection now  Will treat and have her follow up with her PCP  Patient Instructions   Patient Instructions   Take Augmentin as directed  Continue Mucinex  Anbesol or Orajel to the ulcers  If no improvement 3-4 days follow-up with PCP  If anything changes or worsens follow-up sooner      Proceed to ER if symptoms worsen  Chief Complaint     Chief Complaint   Patient presents with    Cough     5 days    Earache     right a week         History of Present Illness   Patient with past medical history of chronic pain presents complaining of right ear pain times 1 week  Reports she has been sick for the past 2 weeks with chest congestion  States she was put on prednisone and an antibiotic 2 weeks ago and reports some mild improvement but then states she started with head congestion about a week ago  States a few days after the head congestion started she started with right ear pain which has been worsening over the past week  She reports right ear pain, sore throat and right swollen lymph nodes  She reports continued cough for the past several days as well but denies any chest tightness, chest pain, shortness of breath or dyspnea  Denies any fevers or chills at home  Has not tried anything over-the-counter for her symptoms  Review of Systems   Review of Systems   Constitutional: Negative for chills and fever     HENT: Positive for congestion, ear pain, rhinorrhea, sinus pressure, sinus pain and sore throat  Respiratory: Positive for cough  Negative for chest tightness, shortness of breath, wheezing and stridor  Cardiovascular: Negative for chest pain and palpitations           Current Medications       Current Outpatient Medications:     albuterol (2 5 mg/3 mL) 0 083 % nebulizer solution, Take 1 vial (2 5 mg total) by nebulization every 6 (six) hours as needed for wheezing or shortness of breath, Disp: 15 vial, Rfl: 0    albuterol (VENTOLIN HFA) 90 mcg/act inhaler, Inhale, Disp: , Rfl:     ALBUTEROL IN, Inhale 2 puffs every 6 (six) hours as needed, Disp: , Rfl:     amitriptyline (ELAVIL) 50 mg tablet, Take by mouth, Disp: , Rfl:     atenolol (TENORMIN) 50 mg tablet, Take 50 mg by mouth, Disp: , Rfl:     atenolol-chlorthalidone (TENORETIC) 50-25 mg per tablet, Take by mouth, Disp: , Rfl:     Biotin 1 MG CAPS, Take by mouth, Disp: , Rfl:     Biotin 5 MG TABS, Take by mouth, Disp: , Rfl:     cetirizine (ZyrTEC) 10 mg tablet, Take by mouth, Disp: , Rfl:     Cholecalciferol (VITAMIN D3) 1000 UNIT/SPRAY LIQD, Take by mouth, Disp: , Rfl:     cyclobenzaprine (FLEXERIL) 10 mg tablet, Take by mouth, Disp: , Rfl:     ezetimibe-simvastatin (VYTORIN) 10-40 mg per tablet, Take by mouth, Disp: , Rfl:     fluticasone (FLONASE) 50 mcg/act nasal spray, into each nostril, Disp: , Rfl:     fluticasone (FLOVENT HFA) 220 mcg/act inhaler, Inhale, Disp: , Rfl:     levothyroxine 200 mcg tablet, Take by mouth, Disp: , Rfl:     omeprazole (PriLOSEC) 20 mg delayed release capsule, Take by mouth, Disp: , Rfl:     predniSONE 10 mg tablet, Take 4 tablets starting 8/16, then 3 tablets, then 2 tablets, then 1 tablet, Disp: 10 tablet, Rfl: 0    pregabalin (LYRICA) 100 mg capsule, Take by mouth, Disp: , Rfl:     prochlorperazine (COMPAZINE) 10 mg tablet, Take by mouth, Disp: , Rfl:     Promethazine-DM (PHENERGAN-DM) 6 25-15 mg/5 mL oral syrup, Take 5 mL by mouth 4 (four) times a day as needed for cough, Disp: 120 mL, Rfl: 0    SUMAtriptan (IMITREX) 100 mg tablet, Take by mouth, Disp: , Rfl:     amoxicillin-clavulanate (AUGMENTIN) 875-125 mg per tablet, Take 1 tablet by mouth every 12 (twelve) hours for 10 days, Disp: 20 tablet, Rfl: 0    dextromethorphan-guaifenesin (MUCINEX DM)  MG per 12 hr tablet, Take 1 tablet by mouth every 12 (twelve) hours (Patient not taking: Reported on 8/15/2019), Disp: 12 tablet, Rfl: 0    promethazine-codeine (PHENERGAN WITH CODEINE) 6 25-10 mg/5 mL syrup, Take 5 mL by mouth every 4 (four) hours as needed for cough (Patient not taking: Reported on 8/30/2019), Disp: 120 mL, Rfl: 0    Current Allergies     Allergies as of 08/30/2019 - Reviewed 08/30/2019   Allergen Reaction Noted    Seasonal ic [cholestatin]  05/26/2018              Past Medical History:   Diagnosis Date    Acid reflux     Asthma     Back pain     Hypertension     Hypothyroidism     Nerve pain     Seasonal allergies     Urinary tract infection        No past surgical history on file  No family history on file  Medications have been verified  The following portions of the patient's history were reviewed and updated as appropriate: allergies, current medications, past family history, past medical history, past social history, past surgical history and problem list     Objective   /90   Pulse 89   Temp 98 2 °F (36 8 °C)   Resp 20   Ht 5' 5" (1 651 m)   Wt 113 kg (250 lb)   SpO2 97%   BMI 41 60 kg/m²      Physical Exam     Physical Exam   Constitutional: She appears well-developed and well-nourished  No distress  HENT:   Right TM is erythematous and injected with small amount of purulent fluid behind  Left TM is not visible due to cerumen  Nasal mucosa is erythematous and edematous  Posterior oropharynx clear without erythema, edema, tonsillar hypertrophy or exudates  Smal white grayish pinpoint ulcerations to the soft palate    Uvula midline  Soft palate equal   +PND   Cardiovascular: Normal rate, regular rhythm and normal heart sounds  Pulmonary/Chest: Effort normal and breath sounds normal  No stridor  No respiratory distress  She has no wheezes  She has no rales  Full equal breath sounds throughout  No decreased breath sounds  No rhonchi rales or wheezing  Lymphadenopathy:     She has cervical adenopathy (Right tonsillar)  Skin: She is not diaphoretic  Vitals reviewed

## 2020-11-16 NOTE — PATIENT INSTRUCTIONS
Take Augmentin as directed  Continue Mucinex  Anbesol or Orajel to the ulcers  If no improvement 3-4 days follow-up with PCP  If anything changes or worsens follow-up sooner Yes

## 2022-06-22 ENCOUNTER — APPOINTMENT (OUTPATIENT)
Dept: RADIOLOGY | Facility: CLINIC | Age: 67
End: 2022-06-22
Payer: COMMERCIAL

## 2022-06-22 ENCOUNTER — CONSULT (OUTPATIENT)
Dept: PAIN MEDICINE | Facility: CLINIC | Age: 67
End: 2022-06-22
Payer: COMMERCIAL

## 2022-06-22 VITALS
SYSTOLIC BLOOD PRESSURE: 130 MMHG | OXYGEN SATURATION: 96 % | BODY MASS INDEX: 42.99 KG/M2 | TEMPERATURE: 98.2 F | HEART RATE: 81 BPM | DIASTOLIC BLOOD PRESSURE: 80 MMHG | WEIGHT: 258 LBS | HEIGHT: 65 IN

## 2022-06-22 DIAGNOSIS — E66.01 MORBID OBESITY WITH BMI OF 40.0-44.9, ADULT (HCC): ICD-10-CM

## 2022-06-22 DIAGNOSIS — M79.7 FIBROMYALGIA: ICD-10-CM

## 2022-06-22 DIAGNOSIS — M54.16 LUMBAR RADICULOPATHY: Primary | ICD-10-CM

## 2022-06-22 DIAGNOSIS — M54.16 LUMBAR RADICULOPATHY: ICD-10-CM

## 2022-06-22 DIAGNOSIS — M54.12 CERVICAL RADICULOPATHY: ICD-10-CM

## 2022-06-22 DIAGNOSIS — G89.4 CHRONIC PAIN SYNDROME: ICD-10-CM

## 2022-06-22 PROBLEM — M48.061 LUMBAR STENOSIS: Status: ACTIVE | Noted: 2022-06-09

## 2022-06-22 PROBLEM — R93.89 THICKENED ENDOMETRIUM: Status: ACTIVE | Noted: 2017-08-09

## 2022-06-22 PROBLEM — E78.2 MIXED HYPERLIPIDEMIA: Status: ACTIVE | Noted: 2022-04-06

## 2022-06-22 PROBLEM — E03.9 HYPOTHYROIDISM: Status: ACTIVE | Noted: 2017-08-09

## 2022-06-22 PROBLEM — I10 BENIGN ESSENTIAL HYPERTENSION: Status: ACTIVE | Noted: 2022-04-06

## 2022-06-22 PROBLEM — M54.50 LOW BACK PAIN: Status: ACTIVE | Noted: 2017-08-09

## 2022-06-22 PROBLEM — J45.909 ASTHMA: Status: ACTIVE | Noted: 2017-08-09

## 2022-06-22 PROBLEM — N83.8 BILATERAL TUBO-OVARIAN MASS: Status: ACTIVE | Noted: 2017-08-09

## 2022-06-22 PROBLEM — F41.9 ANXIETY: Status: ACTIVE | Noted: 2017-08-09

## 2022-06-22 PROBLEM — S32.009A LUMBAR TRANSVERSE PROCESS FRACTURE (HCC): Status: ACTIVE | Noted: 2018-11-06

## 2022-06-22 PROBLEM — R73.9 HYPERGLYCEMIA: Status: ACTIVE | Noted: 2022-04-06

## 2022-06-22 PROBLEM — D21.9 FIBROIDS: Status: ACTIVE | Noted: 2017-08-09

## 2022-06-22 PROCEDURE — 72050 X-RAY EXAM NECK SPINE 4/5VWS: CPT

## 2022-06-22 PROCEDURE — 72110 X-RAY EXAM L-2 SPINE 4/>VWS: CPT

## 2022-06-22 PROCEDURE — 99245 OFF/OP CONSLTJ NEW/EST HI 55: CPT | Performed by: ANESTHESIOLOGY

## 2022-06-22 RX ORDER — ASCORBIC ACID 250 MG
1 TABLET,CHEWABLE ORAL DAILY
COMMUNITY
Start: 2022-06-05

## 2022-06-22 RX ORDER — DIAZEPAM 5 MG/1
5 TABLET ORAL 3 TIMES DAILY
COMMUNITY
Start: 2022-06-07

## 2022-06-22 NOTE — PROGRESS NOTES
Assessment  1  Lumbar radiculopathy    2  Cervical radiculopathy    3  Chronic pain syndrome    4  Morbid obesity with BMI of 40 0-44 9, adult ([address] )    5  Colette Mason presents with multiple medical complaints regarding her chronic pain she is quite agitated at today's visit  She did arrive late did not finish her paperwork  She is been following with Neurology and I do recommend following through with physical therapy  To rule out any acute pathology have ordered radiographs of the cervical and lumbar spine  She apparently saw pain medicine physicians previously who ordered opioids but I would explain to the patient that I would not entertain any opioids at this time, I do not believe there was good long-term goal     In addition we need to wait for the results of the MRI which she expressed an understanding  She states she is had epidural steroid injections in the past with excellent relief  Encouraged her to increase her Lyrica as per Neurology to 150 mg 3 times daily  Unfortunately as I explained to the patient, until we obtain the MRI and radiographs will need to hold off on any further treatment  We have scheduled follow-up visit  My impressions and treatment recommendations were discussed in detail with the patient who verbalized understanding and had no further questions  Discharge instructions were provided  I personally saw and examined the patient and I agree with the above discussed plan of care  This note is created using dictation transcription  It may contain typographical errors, grammatical errors, improperly dictated words, background noise and other errors  Orders Placed This Encounter   Procedures    X-ray lumbar spine complete 4+ views     Standing Status:   Future     Standing Expiration Date:   6/22/2026     Scheduling Instructions:      Bring along any outside films relating to this procedure            XR spine cervical complete 4 or 5 vw non injury     Standing Status:   Future     Standing Expiration Date:   6/22/2026     Scheduling Instructions:      Bring along any outside films relating to this procedure  Order Specific Question:   Reason for Exam:     Answer:   neckpain     New Medications Ordered This Visit   Medications    Ascorbic Acid (Vitamin C) 250 MG CHEW     Sig: Chew 1 tablet daily    diazepam (VALIUM) 5 mg tablet     Sig: Take 5 mg by mouth 3 (three) times a day     Referred By: Rakesh Gaston MD  History of Present Illness    Maranda Hassan is a 61 y o  female with history of chronic pain referred from Neurology  She is pain severe left side of her neck down her left arm with her low back and both legs  She was started on Lyrica 150 mg twice daily which she does not have any relief or side effects  She was told increase her Lyrica but has not increase to date  She was given a prescription for physical therapy but has started today  Pain is severe and constant  She is a history of chronic pain saw previous pain physicians underwent epidural steroid injections as well as prescribed opioids, gabapentin, Cymbalta  She feels the opioids provided the most significant relief  Describes pain is burning cramping shooting she is subjective weakness of the upper lower limbs she is a cane and occasionally a rolling walker when her pain flares up for ambulation  Standing and walking increases symptoms rest relaxation somewhat reduces her pain  She is a history of fibromyalgia  I have personally reviewed and/or updated the patient's past medical history, past surgical history, family history, social history, current medications, allergies, and vital signs today  Review of Systems   Constitutional: Negative for fever and unexpected weight change  HENT: Negative for trouble swallowing  Eyes: Negative for visual disturbance  Respiratory: Negative for shortness of breath and wheezing      Cardiovascular: Negative for chest pain and palpitations  Gastrointestinal: Negative for constipation, diarrhea, nausea and vomiting  Endocrine: Negative for cold intolerance, heat intolerance and polydipsia  Genitourinary: Negative for difficulty urinating and frequency  Musculoskeletal: Negative for arthralgias, gait problem, joint swelling and myalgias  Skin: Negative for rash  Neurological: Negative for dizziness, seizures, syncope, weakness and headaches  Hematological: Does not bruise/bleed easily  Psychiatric/Behavioral: Negative for dysphoric mood  All other systems reviewed and are negative  Patient Active Problem List   Diagnosis    Vaginal discomfort    Anxiety    Asthma    Benign essential hypertension    Bilateral tubo-ovarian mass    Candidal intertrigo    Cervical radiculopathy    Fibroids    Fibromyalgia    Hyperglycemia    Hypothyroidism    Low back pain    Lumbar stenosis    Lumbar transverse process fracture (HCC)    Mixed hyperlipidemia    Morbid obesity with BMI of 40 0-44 9, adult ([facility] [address]    Thickened endometrium       Past Medical History:   Diagnosis Date    Acid reflux     Asthma     Back pain     Hypertension     Hypothyroidism     Nerve pain     Seasonal allergies     Urinary tract infection        History reviewed  No pertinent surgical history  History reviewed  No pertinent family history      Social History     Occupational History    Not on file   Tobacco Use    Smoking status: Never Smoker    Smokeless tobacco: Never Used   Substance and Sexual Activity    Alcohol use: Yes     Comment: occassional    Drug use: No    Sexual activity: Not on file       Current Outpatient Medications on File Prior to Visit   Medication Sig    albuterol (2 5 mg/3 mL) 0 083 % nebulizer solution Take 1 vial (2 5 mg total) by nebulization every 6 (six) hours as needed for wheezing or shortness of breath    albuterol (PROVENTIL HFA,VENTOLIN HFA) 90 mcg/act inhaler Inhale    ALBUTEROL IN Inhale 2 puffs every 6 (six) hours as needed    amitriptyline (ELAVIL) 50 mg tablet Take by mouth    Ascorbic Acid (Vitamin C) 250 MG CHEW Chew 1 tablet daily    atenolol (TENORMIN) 50 mg tablet Take 50 mg by mouth    atenolol-chlorthalidone (TENORETIC) 50-25 mg per tablet Take by mouth    Biotin 1 MG CAPS Take by mouth    Biotin 5 MG TABS Take by mouth    cetirizine (ZyrTEC) 10 mg tablet Take by mouth    Cholecalciferol (VITAMIN D3) 1000 UNIT/SPRAY LIQD Take by mouth    cyclobenzaprine (FLEXERIL) 10 mg tablet Take by mouth    diazepam (VALIUM) 5 mg tablet Take 5 mg by mouth 3 (three) times a day    ezetimibe-simvastatin (VYTORIN) 10-40 mg per tablet Take by mouth    fluticasone (FLONASE) 50 mcg/act nasal spray into each nostril    fluticasone (FLOVENT HFA) 220 mcg/act inhaler Inhale    levothyroxine 200 mcg tablet Take by mouth    omeprazole (PriLOSEC) 20 mg delayed release capsule Take by mouth    predniSONE 10 mg tablet Take 4 tablets starting 8/16, then 3 tablets, then 2 tablets, then 1 tablet    pregabalin (LYRICA) 100 mg capsule Take by mouth    prochlorperazine (COMPAZINE) 10 mg tablet Take by mouth    Promethazine-DM (PHENERGAN-DM) 6 25-15 mg/5 mL oral syrup Take 5 mL by mouth 4 (four) times a day as needed for cough    SUMAtriptan (IMITREX) 100 mg tablet Take by mouth    dextromethorphan-guaifenesin (MUCINEX DM)  MG per 12 hr tablet Take 1 tablet by mouth every 12 (twelve) hours (Patient not taking: No sig reported)    promethazine-codeine (PHENERGAN WITH CODEINE) 6 25-10 mg/5 mL syrup Take 5 mL by mouth every 4 (four) hours as needed for cough (Patient not taking: No sig reported)     No current facility-administered medications on file prior to visit         Allergies   Allergen Reactions    Seasonal Ic [Cholestatin]        Physical Exam    /80 (BP Location: Left arm, Patient Position: Sitting, Cuff Size: Adult)   Pulse 81   Temp 98 2 °F (36 8 °C)   Ht 5' 5" (1 651 m)   Wt 117 kg (258 lb)   SpO2 96%   BMI 42 93 kg/m²     Constitutional: normal, well developed, well nourished, alert, in no distress and non-toxic and no overt pain behavior  and obese  Eyes: anicteric  HEENT: grossly intact  Neck: supple, symmetric, trachea midline and no masses   Pulmonary:even and unlabored  Cardiovascular:No edema or pitting edema present  Skin:Normal without rashes or lesions and well hydrated  Psychiatric:Mood and affect appropriate  Neurologic:Cranial Nerves II-XII grossly intact  Musculoskeletal:normal and antalgic, no focal motor deficit appreciated upper lower limbs, deep tendon reflexes diminished but symmetrical bilateral upper lower limbs, positive cervical Spurling maneuver on the left, negative bilateral straight leg raising, no tenderness along the spinous process positive bilateral lumbar paravertebral tenderness no greater trochanteric tenderness bilateral    Imaging  Xray Lumbar Spine @ [facility]  Impression:     No evidence of acute fracture  MRI Lumbar Spine @ [facility] 8-1-17  Impression: Moderate to severe spinal stenosis at L3-L4 with mild   circumferential mass effect on the thecal sac and its contents, similar to the   prior examination  Moderate spinal stenosis at L4-L5, unchanged  At the level   degenerative changes as above  I have personally reviewed pertinent films in PACS

## 2022-06-28 ENCOUNTER — TELEPHONE (OUTPATIENT)
Dept: PAIN MEDICINE | Facility: CLINIC | Age: 67
End: 2022-06-28

## 2022-06-28 NOTE — TELEPHONE ENCOUNTER
Patient would like imaging results when available and the next steps in care - thank you       hannah [phone]

## 2022-06-30 NOTE — TELEPHONE ENCOUNTER
S/w pt, advised of above  Pt verbalized understanding and appreciation  Will fu w/ NM on 7/21 as scheduled

## 2022-07-21 ENCOUNTER — OFFICE VISIT (OUTPATIENT)
Dept: PAIN MEDICINE | Facility: CLINIC | Age: 67
End: 2022-07-21
Payer: COMMERCIAL

## 2022-07-21 VITALS
TEMPERATURE: 98.3 F | BODY MASS INDEX: 40.15 KG/M2 | HEART RATE: 82 BPM | HEIGHT: 65 IN | DIASTOLIC BLOOD PRESSURE: 80 MMHG | SYSTOLIC BLOOD PRESSURE: 155 MMHG | WEIGHT: 241 LBS | OXYGEN SATURATION: 96 %

## 2022-07-21 DIAGNOSIS — M50.120 CERVICAL DISC DISORDER WITH RADICULOPATHY OF MID-CERVICAL REGION: ICD-10-CM

## 2022-07-21 DIAGNOSIS — M47.812 CERVICAL SPONDYLOSIS: ICD-10-CM

## 2022-07-21 DIAGNOSIS — M54.50 CHRONIC BILATERAL LOW BACK PAIN, UNSPECIFIED WHETHER SCIATICA PRESENT: ICD-10-CM

## 2022-07-21 DIAGNOSIS — M47.816 LUMBAR SPONDYLOSIS: ICD-10-CM

## 2022-07-21 DIAGNOSIS — G89.4 CHRONIC PAIN SYNDROME: ICD-10-CM

## 2022-07-21 DIAGNOSIS — M54.2 NECK PAIN: ICD-10-CM

## 2022-07-21 DIAGNOSIS — M51.36 DISC DEGENERATION, LUMBAR: ICD-10-CM

## 2022-07-21 DIAGNOSIS — G89.29 CHRONIC BILATERAL LOW BACK PAIN, UNSPECIFIED WHETHER SCIATICA PRESENT: ICD-10-CM

## 2022-07-21 PROCEDURE — 99214 OFFICE O/P EST MOD 30 MIN: CPT | Performed by: NURSE PRACTITIONER

## 2022-07-21 NOTE — PROGRESS NOTES
Assessment:  1  Chronic pain syndrome    2  Neck pain    3  Chronic bilateral low back pain, unspecified whether sciatica present    4  Cervical spondylosis    5  Cervical disc disorder with radiculopathy of mid-cervical region    6  Disc degeneration, lumbar    7  Lumbar spondylosis        Plan:  The patient is a 61 y o  female last seen on 06/22/2022 who presents for a follow up office visit in regards to chronic pain secondary to neck and low back pain, cervical spondylosis, cervical degenerative disc disease, lumbar degenerative disc disease and lumbar spondylosis  Patient presents today with ongoing neck and low back pain  The low back pain is most significant at this time  She feels pain across the low back and into both legs  She has been seen by pain management in the past, and underwent epidural steroid injections which were helpful  However these injections were performed under anesthesia  The patient was made aware our office does not perform injections under anesthesia  Because of the patients anxiety, I do have some concern she may not be able to tolerate all procedures  At this time, I recommend she start physical therapy and complete 6 weeks  At that time if still having pain, will order MRI of the cervical and lumbar spine  In regards to medications, we will avoid opioids  Her anxiety is being treated with valium 5 mg 1 tab 3 times a day, which if combined with opioids, would place her at a very high risk for respiratory depression  She has failed gabapentin in the past   Would consider nortriptyline or Cymbalta if she is weaned off amitriptyline by Neurology in the future  At this time, I will start her on diclofenac 50 mg 1 tablet twice a day to help with the inflammation and pain  She was instructed to take the medication with food to prevent GI irritation  A prescription was sent to the pharmacy with 1 refill      The patient will follow-up in 8 weeks for medication prescription refill and reevaluation  The patient was advised to contact the office should their symptoms worsen in the interim  The patient was agreeable and verbalized an understanding  History of Present Illness: The patient is a 61 y o  female last seen on 06/22/2022 who presents for a follow up office visit in regards to chronic pain secondary to neck and low back pain, cervical spondylosis, cervical degenerative disc disease, lumbar degenerative disc disease and lumbar spondylosis  Her last office visit was June 22, 2022 which was her initial consultation  She was ordered physical therapy  Patient presents today with ongoing neck and low back pain  Her neck pain radiates into both shoulders and down the left arm, while the low back pain radiates across the low back and down the posterior aspect of both legs  The back and leg pain is most significant at this time, with the left leg being worse than the right leg  The pain is constant and described as burning, sharp, throbbing, shooting, numbness, and pins and needles  She feels pain worsesn with activity or lifting anything heavy  Sitting helps relieve the pain, but if she sits too long the pain will worsen  She uses a cane to ambulate  She is rating her pain a 10/10 on the numeric rating scale  She is currently taking Lyrica 150 mg 1 tablet 2-3 times a day and amitriptyline 50 mg 1 tablet daily which is prescribed by Neurology  She states she often forgets the mid day dose of Lyrica  She also feels both Lyrica and amitriptyline provide little relief  She had epidural steroid injections in the past, which were helpful, but has failed Celebrex and gabapentin  Since the last office visit, she was not able to start physical therapy  She states she did make an appointment to start       I have personally reviewed and/or updated the patient's past medical history, past surgical history, family history, social history, current medications, allergies, and vital signs today  Review of Systems:    Review of Systems   Gastrointestinal: Positive for nausea  Musculoskeletal: Positive for gait problem  Decreased rom  LLE Pain   Neurological: Positive for weakness  Past Medical History:   Diagnosis Date    Acid reflux     Anxiety     Asthma     Back pain     Fibromyalgia, primary     Hyperlipidemia     Hypertension     Hypothyroidism     Nerve pain     Seasonal allergies     Urinary tract infection        No past surgical history on file  No family history on file      Social History     Occupational History    Not on file   Tobacco Use    Smoking status: Never Smoker    Smokeless tobacco: Never Used   Substance and Sexual Activity    Alcohol use: Yes     Comment: occassional    Drug use: No    Sexual activity: Not on file         Current Outpatient Medications:     albuterol (2 5 mg/3 mL) 0 083 % nebulizer solution, Take 1 vial (2 5 mg total) by nebulization every 6 (six) hours as needed for wheezing or shortness of breath, Disp: 15 vial, Rfl: 0    albuterol (PROVENTIL HFA,VENTOLIN HFA) 90 mcg/act inhaler, Inhale, Disp: , Rfl:     ALBUTEROL IN, Inhale 2 puffs every 6 (six) hours as needed, Disp: , Rfl:     amitriptyline (ELAVIL) 50 mg tablet, Take by mouth, Disp: , Rfl:     Ascorbic Acid (Vitamin C) 250 MG CHEW, Chew 1 tablet daily, Disp: , Rfl:     atenolol (TENORMIN) 50 mg tablet, Take 50 mg by mouth, Disp: , Rfl:     atenolol-chlorthalidone (TENORETIC) 50-25 mg per tablet, Take by mouth, Disp: , Rfl:     Biotin 1 MG CAPS, Take by mouth, Disp: , Rfl:     Biotin 5 MG TABS, Take by mouth, Disp: , Rfl:     cetirizine (ZyrTEC) 10 mg tablet, Take by mouth, Disp: , Rfl:     Cholecalciferol (VITAMIN D3) 1000 UNIT/SPRAY LIQD, Take by mouth, Disp: , Rfl:     cyclobenzaprine (FLEXERIL) 10 mg tablet, Take by mouth, Disp: , Rfl:     dextromethorphan-guaifenesin (MUCINEX DM)  MG per 12 hr tablet, Take 1 tablet by mouth every 12 (twelve) hours (Patient not taking: No sig reported), Disp: 12 tablet, Rfl: 0    diazepam (VALIUM) 5 mg tablet, Take 5 mg by mouth 3 (three) times a day, Disp: , Rfl:     ezetimibe-simvastatin (VYTORIN) 10-40 mg per tablet, Take by mouth, Disp: , Rfl:     fluticasone (FLONASE) 50 mcg/act nasal spray, into each nostril, Disp: , Rfl:     fluticasone (FLOVENT HFA) 220 mcg/act inhaler, Inhale, Disp: , Rfl:     levothyroxine 200 mcg tablet, Take by mouth, Disp: , Rfl:     omeprazole (PriLOSEC) 20 mg delayed release capsule, Take by mouth, Disp: , Rfl:     predniSONE 10 mg tablet, Take 4 tablets starting 8/16, then 3 tablets, then 2 tablets, then 1 tablet, Disp: 10 tablet, Rfl: 0    pregabalin (LYRICA) 100 mg capsule, Take by mouth, Disp: , Rfl:     prochlorperazine (COMPAZINE) 10 mg tablet, Take by mouth, Disp: , Rfl:     promethazine-codeine (PHENERGAN WITH CODEINE) 6 25-10 mg/5 mL syrup, Take 5 mL by mouth every 4 (four) hours as needed for cough (Patient not taking: No sig reported), Disp: 120 mL, Rfl: 0    Promethazine-DM (PHENERGAN-DM) 6 25-15 mg/5 mL oral syrup, Take 5 mL by mouth 4 (four) times a day as needed for cough, Disp: 120 mL, Rfl: 0    SUMAtriptan (IMITREX) 100 mg tablet, Take by mouth, Disp: , Rfl:     Allergies   Allergen Reactions    Seasonal Ic [Cholestatin]        Physical Exam:    There were no vitals taken for this visit  Constitutional:normal, well developed, well nourished, alert, in no distress and non-toxic and no overt pain behavior  Eyes:anicteric  HEENT:grossly intact  Neck:supple, symmetric, trachea midline and no masses   Pulmonary:even and unlabored  Cardiovascular:No edema or pitting edema present  Skin:Normal without rashes or lesions and well hydrated  Psychiatric:Mood and affect appropriate  Neurologic:Cranial Nerves II-XII grossly intact  Musculoskeletal:ambulates with cane   pitting edema bilateral lower extremities      Lumbar Spine Exam    Appearance:  Normal lordosis  Palpation/Tenderness:  left lumbar paraspinal tenderness  right lumbar paraspinal tenderness  Range of Motion:  Flexion: Moderately limited  with pain  Extension:  Moderately limited  with pain  Motor Strength:  Left hip flexion:  5/5  Right hip flexion:  5/5  Left knee flexion:  5/5  Left knee extension:  5/5  Right knee flexion:  5/5  Right knee extension:  5/5  Left foot dorsiflexion:  5/5  Left foot plantar flexion:  5/5  Right foot dorsiflexion:  5/5  Right foot plantar flexion:  5/5    Imaging    CERVICAL SPINE     INDICATION:   M54 16: Radiculopathy, lumbar region  M54 12: Radiculopathy, cervical region      COMPARISON:  None     VIEWS:  XR SPINE CERVICAL COMPLETE 4 OR 5 VW NON INJURY         FINDINGS:     No fracture       Normal alignment without subluxation      Moderate degenerative disc disease at C4-5, C5-6 and C6-7 with disc space narrowing, endplate sclerosis, osteophyte formation and facet arthropathy  There is associated mild to moderate multilevel foraminal narrowings secondary to facet arthropathy and   uncovertebral spurring       The prevertebral soft tissues are within normal limits        The lung apices are clear      IMPRESSION:     No acute osseous abnormality      Degenerative changes as above   LUMBAR SPINE     INDICATION:   M54 16: Radiculopathy, lumbar region  M54 12: Radiculopathy, cervical region      COMPARISON:  None     VIEWS:  XR SPINE LUMBAR MINIMUM 4 VIEWS NON INJURY        FINDINGS:     There are 5 non rib bearing lumbar vertebral bodies       There is no evidence of acute fracture or destructive osseous lesion      Alignment is unremarkable       Moderate disc space narrowing with endplate sclerosis and osteophyte formation at L5-S1  Mild disc space narrowing and endplate sclerosis at other disc levels    There is multilevel facet joint degenerative arthropathy      The pedicles appear intact      Atherosclerotic calcifications      IMPRESSION:     No acute osseous abnormality        Degenerative changes as described  No orders to display         No orders of the defined types were placed in this encounter

## 2022-09-15 ENCOUNTER — OFFICE VISIT (OUTPATIENT)
Dept: PAIN MEDICINE | Facility: CLINIC | Age: 67
End: 2022-09-15
Payer: COMMERCIAL

## 2022-09-15 VITALS
HEIGHT: 65 IN | TEMPERATURE: 98.1 F | BODY MASS INDEX: 43.32 KG/M2 | DIASTOLIC BLOOD PRESSURE: 85 MMHG | WEIGHT: 260 LBS | SYSTOLIC BLOOD PRESSURE: 160 MMHG

## 2022-09-15 DIAGNOSIS — M54.2 NECK PAIN: ICD-10-CM

## 2022-09-15 DIAGNOSIS — R10.32 LEFT GROIN PAIN: Primary | ICD-10-CM

## 2022-09-15 DIAGNOSIS — M54.50 CHRONIC BILATERAL LOW BACK PAIN, UNSPECIFIED WHETHER SCIATICA PRESENT: ICD-10-CM

## 2022-09-15 DIAGNOSIS — M47.816 LUMBAR SPONDYLOSIS: ICD-10-CM

## 2022-09-15 DIAGNOSIS — G89.29 CHRONIC BILATERAL LOW BACK PAIN, UNSPECIFIED WHETHER SCIATICA PRESENT: ICD-10-CM

## 2022-09-15 DIAGNOSIS — M50.30 DEGENERATIVE DISC DISEASE, CERVICAL: ICD-10-CM

## 2022-09-15 DIAGNOSIS — M51.36 DISC DEGENERATION, LUMBAR: ICD-10-CM

## 2022-09-15 DIAGNOSIS — M50.120 CERVICAL DISC DISORDER WITH RADICULOPATHY OF MID-CERVICAL REGION: ICD-10-CM

## 2022-09-15 DIAGNOSIS — M47.812 CERVICAL SPONDYLOSIS: ICD-10-CM

## 2022-09-15 DIAGNOSIS — G89.4 CHRONIC PAIN SYNDROME: ICD-10-CM

## 2022-09-15 PROCEDURE — 99214 OFFICE O/P EST MOD 30 MIN: CPT | Performed by: NURSE PRACTITIONER

## 2022-09-15 NOTE — PROGRESS NOTES
Assessment:  1  Chronic pain syndrome    2  Neck pain    3  Cervical spondylosis    4  Degenerative disc disease, cervical    5  Chronic bilateral low back pain, unspecified whether sciatica present    6  Lumbar spondylosis    7  Disc degeneration, lumbar        Plan:  The patient is a 61 y o  female last seen on 07/21/2022 who presents for a follow up office visit in regards to chronic pain secondary to neck and low back pain, cervical spondylosis, cervical degenerative disc disease, lumbar degenerative disc disease and lumbar spondylosis  patient presents today with ongoing neck and low back pain  She has been in physical therapy for the past 5 weeks  She continues to be symptomatic and experiencing a moderate amount of pain  Therefore, I will order an MRI of the cervical and lumbar spine  I will contact her with the results  She also is experiencing left groin pain, that worsens with walking  I will order an x-ray of the left hip to evaluate for osteoarthritis  I will contact her with the results  She will continue on diclofenac as prescribed, and refills were sent to the pharmacy  I advised her to take the medication with food to prevent GI irritation  I also suggested water walking/swimming as a form of exercise to promote weight loss  Lastly, we had a discussion about opioid medications  I do not feel this is a good long-term option to treat her pain  She also is taking Valium 5 mg 3 times a day which places her at risk for respiratory depression  She felt the only medication that had been helpful in the past for her was oxycodone  She has failed multiple neuropathic agents, muscle relaxants, and NSAIDs  She also has anxiety, and is unable to have procedures without sedation, which our office does not perform  Therefore I feel it is in her best interest to seek care from a different pain management [place] that may offer sedation    She was given a list of pain providers in the area      The patient will follow-up as needed reevaluation  The patient was advised to contact the office should their symptoms worsen in the interim  The patient was agreeable and verbalized an understanding  History of Present Illness: The patient is a 61 y o  female last seen on 07/21/2022 who presents for a follow up office visit in regards to chronic pain secondary to neck and low back pain, cervical spondylosis, cervical degenerative disc disease, lumbar degenerative disc disease and lumbar spondylosis  her last office visit was July 21, 2022, in which she was started on diclofenac 50 mg 1 tablet twice a day, and recommended physical therapy  Patient presents today with ongoing neck and low back pain  The pain remains unchanged since the last office visit  It is constant and radiates into both legs  She also feels pain in her feet  The neck pain radiates into the left arm and she feels numbness in both hands  She states the pain worsens throughout the day  She also states walking more than 5 minutes and lifting anything causes the pain to increase  She describes the pain as sharp, throbbing, shooting, numbness, and burning  She is currently rating her pain a 9/10 on the numeric rating scale  Since the last office visit, the patient states she had been doing physical therapy at [facility]    She is currently taking diclofenac 50 mg 1 tablet twice a day but feels the medication    She is currently taking Lyrica 100 mg 1 tablet 3 times a day which is prescribed by neurology  She stopped the amitriptyline 50 mg 1 tablet daily which is prescribed by psychiatry  She states it did not help with her sleep or the pain  She also feels both Lyrica provides little relief  She has failed failed Cymbalta, Celebrex and gabapentin   She states her previous pain medication practice prescribed her oxycodone, which she felt was very helpful     She is not a candidate for opioid medications due to her being on Valium 5 mg 3 times a day  She also would not be a candidate for epidural steroid injections due to her anxiety and inability to have the procedures without sedation      I have personally reviewed and/or updated the patient's past medical history, past surgical history, family history, social history, current medications, allergies, and vital signs today  Review of Systems:    Review of Systems   Respiratory: Positive for shortness of breath  Cardiovascular: Positive for leg swelling  Gastrointestinal: Positive for constipation and nausea  Musculoskeletal: Positive for gait problem  Decreased Rom   Neurological: Positive for dizziness and weakness  Past Medical History:   Diagnosis Date    Acid reflux     Anxiety     Asthma     Back pain     Fibromyalgia, primary     Hyperlipidemia     Hypertension     Hypothyroidism     Nerve pain     Seasonal allergies     Urinary tract infection        No past surgical history on file  No family history on file      Social History     Occupational History    Not on file   Tobacco Use    Smoking status: Never Smoker    Smokeless tobacco: Never Used   Substance and Sexual Activity    Alcohol use: Yes     Comment: occassional    Drug use: No    Sexual activity: Not on file         Current Outpatient Medications:     albuterol (2 5 mg/3 mL) 0 083 % nebulizer solution, Take 1 vial (2 5 mg total) by nebulization every 6 (six) hours as needed for wheezing or shortness of breath, Disp: 15 vial, Rfl: 0    albuterol (PROVENTIL HFA,VENTOLIN HFA) 90 mcg/act inhaler, Inhale, Disp: , Rfl:     ALBUTEROL IN, Inhale 2 puffs every 6 (six) hours as needed, Disp: , Rfl:     amitriptyline (ELAVIL) 50 mg tablet, Take by mouth, Disp: , Rfl:     Ascorbic Acid (Vitamin C) 250 MG CHEW, Chew 1 tablet daily, Disp: , Rfl:     atenolol (TENORMIN) 50 mg tablet, Take 50 mg by mouth, Disp: , Rfl:     atenolol-chlorthalidone (TENORETIC) 50-25 mg per tablet, Take by mouth, Disp: , Rfl:     Biotin 1 MG CAPS, Take by mouth, Disp: , Rfl:     Biotin 5 MG TABS, Take by mouth, Disp: , Rfl:     cetirizine (ZyrTEC) 10 mg tablet, Take by mouth, Disp: , Rfl:     Cholecalciferol (VITAMIN D3) 1000 UNIT/SPRAY LIQD, Take by mouth, Disp: , Rfl:     cyclobenzaprine (FLEXERIL) 10 mg tablet, Take by mouth, Disp: , Rfl:     dextromethorphan-guaifenesin (MUCINEX DM)  MG per 12 hr tablet, Take 1 tablet by mouth every 12 (twelve) hours (Patient not taking: No sig reported), Disp: 12 tablet, Rfl: 0    diazepam (VALIUM) 5 mg tablet, Take 5 mg by mouth 3 (three) times a day, Disp: , Rfl:     diclofenac sodium (VOLTAREN) 50 mg EC tablet, Take 1 tablet (50 mg total) by mouth 2 (two) times a day Take with food, Disp: 60 tablet, Rfl: 1    ezetimibe-simvastatin (VYTORIN) 10-40 mg per tablet, Take by mouth, Disp: , Rfl:     fluticasone (FLONASE) 50 mcg/act nasal spray, into each nostril, Disp: , Rfl:     fluticasone (FLOVENT HFA) 220 mcg/act inhaler, Inhale, Disp: , Rfl:     levothyroxine 200 mcg tablet, Take by mouth, Disp: , Rfl:     omeprazole (PriLOSEC) 20 mg delayed release capsule, Take by mouth, Disp: , Rfl:     predniSONE 10 mg tablet, Take 4 tablets starting 8/16, then 3 tablets, then 2 tablets, then 1 tablet, Disp: 10 tablet, Rfl: 0    pregabalin (LYRICA) 100 mg capsule, Take by mouth 3 (three) times a day, Disp: , Rfl:     prochlorperazine (COMPAZINE) 10 mg tablet, Take by mouth, Disp: , Rfl:     promethazine-codeine (PHENERGAN WITH CODEINE) 6 25-10 mg/5 mL syrup, Take 5 mL by mouth every 4 (four) hours as needed for cough (Patient not taking: No sig reported), Disp: 120 mL, Rfl: 0    Promethazine-DM (PHENERGAN-DM) 6 25-15 mg/5 mL oral syrup, Take 5 mL by mouth 4 (four) times a day as needed for cough, Disp: 120 mL, Rfl: 0    SUMAtriptan (IMITREX) 100 mg tablet, Take by mouth, Disp: , Rfl:     Allergies   Allergen Reactions    Seasonal Ic [Cholestatin]        Physical Exam:    There were no vitals taken for this visit  Constitutional:normal, well developed, well nourished, alert, in no distress and non-toxic and no overt pain behavior  Eyes:anicteric  HEENT:grossly intact  Neck:supple, symmetric, trachea midline and no masses   Pulmonary:even and unlabored  Cardiovascular:No edema or pitting edema present  Skin:Normal without rashes or lesions and well hydrated  Psychiatric:Mood and affect appropriate  Neurologic:Cranial Nerves II-XII grossly intact  Musculoskeletal:ambulates with cane      Imaging    CERVICAL SPINE     INDICATION:   N35 07: Radiculopathy, lumbar region  M54 12: Radiculopathy, cervical region      COMPARISON:  None     VIEWS:  XR SPINE CERVICAL COMPLETE 4 OR 5 VW NON INJURY         FINDINGS:     No fracture       Normal alignment without subluxation      Moderate degenerative disc disease at C4-5, C5-6 and C6-7 with disc space narrowing, endplate sclerosis, osteophyte formation and facet arthropathy   There is associated mild to moderate multilevel foraminal narrowings secondary to facet arthropathy and   uncovertebral spurring       The prevertebral soft tissues are within normal limits        The lung apices are clear      IMPRESSION:     No acute osseous abnormality      Degenerative changes as above          LUMBAR SPINE     INDICATION:   J15 43: Radiculopathy, lumbar region  M54 12:  Radiculopathy, cervical region      COMPARISON:  None     VIEWS:  XR SPINE LUMBAR MINIMUM 4 VIEWS NON INJURY        FINDINGS:     There are 5 non rib bearing lumbar vertebral bodies       There is no evidence of acute fracture or destructive osseous lesion      Alignment is unremarkable       Moderate disc space narrowing with endplate sclerosis and osteophyte formation at L5-S1   Mild disc space narrowing and endplate sclerosis at other disc levels   There is multilevel facet joint degenerative arthropathy      The pedicles appear intact      Atherosclerotic calcifications      IMPRESSION:     No acute osseous abnormality        Degenerative changes as described  No orders to display         No orders of the defined types were placed in this encounter

## 2023-05-23 ENCOUNTER — OFFICE VISIT (OUTPATIENT)
Dept: URGENT CARE | Facility: CLINIC | Age: 68
End: 2023-05-23

## 2023-05-23 ENCOUNTER — APPOINTMENT (OUTPATIENT)
Dept: RADIOLOGY | Facility: CLINIC | Age: 68
End: 2023-05-23

## 2023-05-23 VITALS
HEART RATE: 62 BPM | OXYGEN SATURATION: 99 % | RESPIRATION RATE: 20 BRPM | TEMPERATURE: 98.1 F | SYSTOLIC BLOOD PRESSURE: 131 MMHG | WEIGHT: 220 LBS | DIASTOLIC BLOOD PRESSURE: 69 MMHG | BODY MASS INDEX: 35.36 KG/M2 | HEIGHT: 66 IN

## 2023-05-23 DIAGNOSIS — R52 PAIN: ICD-10-CM

## 2023-05-23 DIAGNOSIS — M25.562 ACUTE PAIN OF LEFT KNEE: Primary | ICD-10-CM

## 2023-05-24 NOTE — PROGRESS NOTES
[facility]        NAME: Briseyda Adair is a 74 y o  female  : 1955    MRN: [id number]  DATE: May 24, 2023  TIME: 7:40 AM    Assessment and Plan   Acute pain of left knee [M25 562]  1  Acute pain of left knee  Ambulatory Referral to Orthopedic Surgery      Acute symptomatic started up from the couch on Thursday and knee started to hurt  Patient denies any injury or trauma  Does have past medical history of chronic knee pain and chronic back pain has been told to see orthopedics has not gone  She is weightbearing and using a soft knee brace and cane  Minimal decrease in range of motion with full extension and flexion  Point tenderness at [facility] and P[facility]  Wet read of x-ray shows no gross acute bone abnormalities  Noted possible Baker's cyst, joint space narrowing, and osteophyte formation  Will recommend continue with soft brace weightbearing as tolerates ice elevate and continue over-the-counter Tylenol/NSAIDs as directed  Will place orthopedic referral for further evaluation  Educated worsening of symptoms follow-up with PCP      Patient Instructions       Follow up with PCP in 3-5 days  Proceed to  ER if symptoms worsen  Chief Complaint     Chief Complaint   Patient presents with   • left knee pain     Stood up Thursday for the sofa and felt pain and hasn't been able to walk on it without pain         History of Present Illness       Patient arrives with complaints of left knee pain primarily medially and posteriorly  Patient reports was sitting on the couch and she went to stand up and her knee started to hurt  She denies any trauma injury  However she does remember that approximately a week prior she was walking up the steps and twisted her leg and possibly caused may be the pain but the pain did not start until a week later  She is weightbearing and using a cane she has applied a soft knee brace    She has a past medical here is history of bilateral knee pain and has been told to go to the orthopedic however has not done so  She has started taking over-the-counter Tylenol/ibuprofen as needed  Review of Systems   Review of Systems   Constitutional: Negative for activity change, appetite change, chills, fatigue and fever  HENT: Negative for congestion, postnasal drip, rhinorrhea, sneezing and sore throat  Respiratory: Negative for cough, chest tightness, shortness of breath and wheezing  Cardiovascular: Negative for chest pain and palpitations  Gastrointestinal: Negative for abdominal pain, constipation, diarrhea, nausea and vomiting  Musculoskeletal: Positive for arthralgias, gait problem and joint swelling  Negative for myalgias  Skin: Negative for color change, pallor and rash  Neurological: Negative for dizziness, weakness, light-headedness and headaches  Hematological: Negative for adenopathy  Psychiatric/Behavioral: Negative for agitation and confusion           Current Medications       Current Outpatient Medications:   •  albuterol (2 5 mg/3 mL) 0 083 % nebulizer solution, Take 1 vial (2 5 mg total) by nebulization every 6 (six) hours as needed for wheezing or shortness of breath, Disp: 15 vial, Rfl: 0  •  albuterol (PROVENTIL HFA,VENTOLIN HFA) 90 mcg/act inhaler, Inhale, Disp: , Rfl:   •  ALBUTEROL IN, Inhale 2 puffs every 6 (six) hours as needed, Disp: , Rfl:   •  Ascorbic Acid (Vitamin C) 250 MG CHEW, Chew 1 tablet daily, Disp: , Rfl:   •  atenolol (TENORMIN) 50 mg tablet, Take 50 mg by mouth, Disp: , Rfl:   •  atenolol-chlorthalidone (TENORETIC) 50-25 mg per tablet, Take by mouth, Disp: , Rfl:   •  Biotin 1 MG CAPS, Take by mouth, Disp: , Rfl:   •  Biotin 5 MG TABS, Take by mouth, Disp: , Rfl:   •  cetirizine (ZyrTEC) 10 mg tablet, Take by mouth, Disp: , Rfl:   •  Cholecalciferol (VITAMIN D3) 1000 UNIT/SPRAY LIQD, Take by mouth, Disp: , Rfl:   •  cyclobenzaprine (FLEXERIL) 10 mg tablet, Take by mouth, Disp: , Rfl:   •  dextromethorphan-guaifenesin [facility] DM)  MG per 12 hr tablet, Take 1 tablet by mouth every 12 (twelve) hours (Patient not taking: No sig reported), Disp: 12 tablet, Rfl: 0  •  diazepam (VALIUM) 5 mg tablet, Take 5 mg by mouth 3 (three) times a day, Disp: , Rfl:   •  diclofenac sodium (VOLTAREN) 50 mg EC tablet, Take 1 tablet (50 mg total) by mouth 2 (two) times a day Take with food, Disp: 60 tablet, Rfl: 1  •  ezetimibe-simvastatin (VYTORIN) 10-40 mg per tablet, Take by mouth, Disp: , Rfl:   •  fluticasone (FLONASE) 50 mcg/act nasal spray, into each nostril, Disp: , Rfl:   •  fluticasone (FLOVENT HFA) 220 mcg/act inhaler, Inhale, Disp: , Rfl:   •  levothyroxine 200 mcg tablet, Take by mouth, Disp: , Rfl:   •  omeprazole (PriLOSEC) 20 mg delayed release capsule, Take by mouth, Disp: , Rfl:   •  predniSONE 10 mg tablet, Take 4 tablets starting 8/16, then 3 tablets, then 2 tablets, then 1 tablet, Disp: 10 tablet, Rfl: 0  •  pregabalin (LYRICA) 100 mg capsule, Take by mouth 3 (three) times a day, Disp: , Rfl:   •  prochlorperazine (COMPAZINE) 10 mg tablet, Take by mouth, Disp: , Rfl:   •  promethazine-codeine (PHENERGAN WITH CODEINE) 6 25-10 mg/5 mL syrup, Take 5 mL by mouth every 4 (four) hours as needed for cough (Patient not taking: No sig reported), Disp: 120 mL, Rfl: 0  •  Promethazine-DM (PHENERGAN-DM) 6 25-15 mg/5 mL oral syrup, Take 5 mL by mouth 4 (four) times a day as needed for cough, Disp: 120 mL, Rfl: 0  •  SUMAtriptan (IMITREX) 100 mg tablet, Take by mouth, Disp: , Rfl:     Current Allergies     Allergies as of 05/23/2023 - Reviewed 05/23/2023   Allergen Reaction Noted   • Seasonal ic [cholestatin]  05/26/2018            The following portions of the patient's history were reviewed and updated as appropriate: allergies, current medications, past family history, past medical history, past social history, past surgical history and problem list      Past Medical History:   Diagnosis Date   • Acid reflux    • Anxiety    • Asthma    • Back "pain    • Fibromyalgia, primary    • Hyperlipidemia    • Hypertension    • Hypothyroidism    • Nerve pain    • Seasonal allergies    • Urinary tract infection        History reviewed  No pertinent surgical history  History reviewed  No pertinent family history  Medications have been verified  Objective   /69   Pulse 62   Temp 98 1 °F (36 7 °C)   Resp 20   Ht 5' 6\" (1 676 m)   Wt 99 8 kg (220 lb)   SpO2 99%   BMI 35 51 kg/m²   No LMP recorded  Patient is postmenopausal        Physical Exam     Physical Exam  Vitals and nursing note reviewed  Constitutional:       General: She is not in acute distress  Appearance: Normal appearance  She is not ill-appearing or diaphoretic  HENT:      Head: Normocephalic and atraumatic  Nose: No congestion or rhinorrhea  Eyes:      General: No scleral icterus  Right eye: No discharge  Left eye: No discharge  Cardiovascular:      Rate and Rhythm: Normal rate and regular rhythm  Pulmonary:      Effort: Pulmonary effort is normal  No respiratory distress  Breath sounds: Normal breath sounds  No stridor  No wheezing, rhonchi or rales  Musculoskeletal:         General: Swelling and tenderness present  Cervical back: Normal range of motion  Left knee: Swelling present  No deformity, erythema, ecchymosis, bony tenderness or crepitus  Decreased range of motion  Tenderness present over the MCL and PCL  Skin:     Coloration: Skin is not jaundiced or pale  Findings: No bruising, erythema or rash  Neurological:      General: No focal deficit present  Mental Status: She is alert and oriented to person, place, and time  Psychiatric:         Mood and Affect: Mood normal          Behavior: Behavior normal          Thought Content:  Thought content normal          Judgment: Judgment normal                    "

## 2023-06-14 ENCOUNTER — OFFICE VISIT (OUTPATIENT)
Dept: OBGYN CLINIC | Facility: MEDICAL CENTER | Age: 68
End: 2023-06-14
Payer: MEDICARE

## 2023-06-14 ENCOUNTER — APPOINTMENT (OUTPATIENT)
Dept: RADIOLOGY | Facility: MEDICAL CENTER | Age: 68
End: 2023-06-14
Payer: MEDICARE

## 2023-06-14 VITALS — BODY MASS INDEX: 35.19 KG/M2 | WEIGHT: 218 LBS

## 2023-06-14 DIAGNOSIS — M17.12 PRIMARY OSTEOARTHRITIS OF LEFT KNEE: Primary | ICD-10-CM

## 2023-06-14 DIAGNOSIS — M25.562 LEFT KNEE PAIN, UNSPECIFIED CHRONICITY: ICD-10-CM

## 2023-06-14 PROCEDURE — 99204 OFFICE O/P NEW MOD 45 MIN: CPT | Performed by: ORTHOPAEDIC SURGERY

## 2023-06-14 PROCEDURE — 20610 DRAIN/INJ JOINT/BURSA W/O US: CPT | Performed by: ORTHOPAEDIC SURGERY

## 2023-06-14 PROCEDURE — 73560 X-RAY EXAM OF KNEE 1 OR 2: CPT

## 2023-06-14 RX ORDER — DICLOFENAC SODIUM 75 MG/1
75 TABLET, DELAYED RELEASE ORAL 2 TIMES DAILY PRN
Qty: 60 TABLET | Refills: 2 | Status: SHIPPED | OUTPATIENT
Start: 2023-06-14

## 2023-06-14 RX ORDER — TRIAMCINOLONE ACETONIDE 40 MG/ML
40 INJECTION, SUSPENSION INTRA-ARTICULAR; INTRAMUSCULAR
Status: COMPLETED | OUTPATIENT
Start: 2023-06-14 | End: 2023-06-14

## 2023-06-14 RX ORDER — BUPIVACAINE HYDROCHLORIDE 2.5 MG/ML
2 INJECTION, SOLUTION INFILTRATION; PERINEURAL
Status: COMPLETED | OUTPATIENT
Start: 2023-06-14 | End: 2023-06-14

## 2023-06-14 RX ADMIN — BUPIVACAINE HYDROCHLORIDE 2 ML: 2.5 INJECTION, SOLUTION INFILTRATION; PERINEURAL at 16:30

## 2023-06-14 RX ADMIN — TRIAMCINOLONE ACETONIDE 40 MG: 40 INJECTION, SUSPENSION INTRA-ARTICULAR; INTRAMUSCULAR at 16:30

## 2023-06-14 NOTE — PROGRESS NOTES
Assessment/Plan     1  Primary osteoarthritis of left knee    2  Left knee pain, unspecified chronicity      Orders Placed This Encounter   Procedures   • Large joint arthrocentesis: L knee   • Brace   • XR knee 1 or 2 vw left   • Ambulatory Referral to Physical Therapy     • Patient presents with severe left knee osteoarthritis  • Discussed conservative and surgical treatment as well as risks and benefits of these treatment options  • After a discussion of risks and benefits the patient elected to proceed with a left knee steroid injection today  Patient should ice and avoid strenuous activity for 1-2 days if needed  Patient should avoid vaccines for 2 weeks if possible  If patient is diabetic should also monitor glucose over the next 7 to 10 days  • PT script provided for the left knee  • Hinge knee brace provided  Diclofenac prn pain, medications warnings were reviewed with patient  Patient NOT to take with other NSAIDs or oral steroids  • Continue Tylenol as needed for pain  1,000 mg up to 3 times a day  Do not exceed 3,000 mg per day  Return in about 3 months (around 9/14/2023) for Recheck, repeat CSI  I answered all of the patient's questions during the visit and provided education of the patient's condition during the visit  The patient verbalized understanding of the information given and agrees with the plan  This note was dictated using MoPix software  It may contain errors including improperly dictated words  Please contact physician directly for any questions  History of Present Illness   Chief complaint: No chief complaint on file  HPI: Petty Russell is a 74 y o  female that c/o left knee pain  Pain has been present for almost a month when she walk walking down the stairs and felt her knee twist  She also experienced a sharp pain in the knee when standing up at home on 5/21/23   She visited Urgent Care on 5/23 where she received X-rays and told to follow up with ortho  Her pain increased after this and she visited the [facility] ED on 6/3/23, where a Baker's cyst and DVT were ruled out  Pain is located medial and posterior, and described as irritating and intermittent, with occasional radiating posterior pain that goes up to mid-hamstring  Patient denies any previous injuries or surgeries  Pain is aggravated  by deep flexion and walking, standing, and stairs, and symptoms include clicking and swelling  She denies any instability  Patient denies any distal radiating pain or distal paresthesias  Pain is alleviated by rest, ice elevation  She has tried Ibuprofen and Tylenol with no relief  Patient has not initiated PT or injections  She has a hinge knee brace that provides relief  She is currently ambulating with a cane  ROS:    See HPI for musculoskeletal review  All other systems reviewed are negative     Historical Information   Past Medical History:   Diagnosis Date   • Acid reflux    • Anxiety    • Asthma    • Back pain    • Fibromyalgia, primary    • Hyperlipidemia    • Hypertension    • Hypothyroidism    • Nerve pain    • Seasonal allergies    • Urinary tract infection      No past surgical history on file  Social History   Social History     Substance and Sexual Activity   Alcohol Use Yes    Comment: occassional     Social History     Substance and Sexual Activity   Drug Use No     Social History     Tobacco Use   Smoking Status Never   Smokeless Tobacco Never     Family History: No family history on file      Current Outpatient Medications on File Prior to Visit   Medication Sig Dispense Refill   • albuterol (2 5 mg/3 mL) 0 083 % nebulizer solution Take 1 vial (2 5 mg total) by nebulization every 6 (six) hours as needed for wheezing or shortness of breath 15 vial 0   • albuterol (PROVENTIL HFA,VENTOLIN HFA) 90 mcg/act inhaler Inhale     • ALBUTEROL IN Inhale 2 puffs every 6 (six) hours as needed     • Ascorbic Acid (Vitamin C) 250 MG CHEW Chew 1 tablet daily     • atenolol (TENORMIN) 50 mg tablet Take 50 mg by mouth     • atenolol-chlorthalidone (TENORETIC) 50-25 mg per tablet Take by mouth     • Biotin 1 MG CAPS Take by mouth     • Biotin 5 MG TABS Take by mouth     • cetirizine (ZyrTEC) 10 mg tablet Take by mouth     • Cholecalciferol (VITAMIN D3) 1000 UNIT/SPRAY LIQD Take by mouth     • cyclobenzaprine (FLEXERIL) 10 mg tablet Take by mouth     • dextromethorphan-guaifenesin (MUCINEX DM)  MG per 12 hr tablet Take 1 tablet by mouth every 12 (twelve) hours (Patient not taking: No sig reported) 12 tablet 0   • diazepam (VALIUM) 5 mg tablet Take 5 mg by mouth 3 (three) times a day     • diclofenac sodium (VOLTAREN) 50 mg EC tablet Take 1 tablet (50 mg total) by mouth 2 (two) times a day Take with food 60 tablet 1   • ezetimibe-simvastatin (VYTORIN) 10-40 mg per tablet Take by mouth     • fluticasone (FLONASE) 50 mcg/act nasal spray into each nostril     • fluticasone (FLOVENT HFA) 220 mcg/act inhaler Inhale     • levothyroxine 200 mcg tablet Take by mouth     • omeprazole (PriLOSEC) 20 mg delayed release capsule Take by mouth     • predniSONE 10 mg tablet Take 4 tablets starting 8/16, then 3 tablets, then 2 tablets, then 1 tablet 10 tablet 0   • pregabalin (LYRICA) 100 mg capsule Take by mouth 3 (three) times a day     • prochlorperazine (COMPAZINE) 10 mg tablet Take by mouth     • promethazine-codeine (PHENERGAN WITH CODEINE) 6 25-10 mg/5 mL syrup Take 5 mL by mouth every 4 (four) hours as needed for cough (Patient not taking: No sig reported) 120 mL 0   • Promethazine-DM (PHENERGAN-DM) 6 25-15 mg/5 mL oral syrup Take 5 mL by mouth 4 (four) times a day as needed for cough 120 mL 0   • SUMAtriptan (IMITREX) 100 mg tablet Take by mouth       No current facility-administered medications on file prior to visit       Allergies   Allergen Reactions   • Seasonal Ic [Cholestatin]        Current Outpatient Medications on File Prior to Visit   Medication Sig Dispense Refill   • albuterol (2 5 mg/3 mL) 0 083 % nebulizer solution Take 1 vial (2 5 mg total) by nebulization every 6 (six) hours as needed for wheezing or shortness of breath 15 vial 0   • albuterol (PROVENTIL HFA,VENTOLIN HFA) 90 mcg/act inhaler Inhale     • ALBUTEROL IN Inhale 2 puffs every 6 (six) hours as needed     • Ascorbic Acid (Vitamin C) 250 MG CHEW Chew 1 tablet daily     • atenolol (TENORMIN) 50 mg tablet Take 50 mg by mouth     • atenolol-chlorthalidone (TENORETIC) 50-25 mg per tablet Take by mouth     • Biotin 1 MG CAPS Take by mouth     • Biotin 5 MG TABS Take by mouth     • cetirizine (ZyrTEC) 10 mg tablet Take by mouth     • Cholecalciferol (VITAMIN D3) 1000 UNIT/SPRAY LIQD Take by mouth     • cyclobenzaprine (FLEXERIL) 10 mg tablet Take by mouth     • dextromethorphan-guaifenesin (MUCINEX DM)  MG per 12 hr tablet Take 1 tablet by mouth every 12 (twelve) hours (Patient not taking: No sig reported) 12 tablet 0   • diazepam (VALIUM) 5 mg tablet Take 5 mg by mouth 3 (three) times a day     • diclofenac sodium (VOLTAREN) 50 mg EC tablet Take 1 tablet (50 mg total) by mouth 2 (two) times a day Take with food 60 tablet 1   • ezetimibe-simvastatin (VYTORIN) 10-40 mg per tablet Take by mouth     • fluticasone (FLONASE) 50 mcg/act nasal spray into each nostril     • fluticasone (FLOVENT HFA) 220 mcg/act inhaler Inhale     • levothyroxine 200 mcg tablet Take by mouth     • omeprazole (PriLOSEC) 20 mg delayed release capsule Take by mouth     • predniSONE 10 mg tablet Take 4 tablets starting 8/16, then 3 tablets, then 2 tablets, then 1 tablet 10 tablet 0   • pregabalin (LYRICA) 100 mg capsule Take by mouth 3 (three) times a day     • prochlorperazine (COMPAZINE) 10 mg tablet Take by mouth     • promethazine-codeine (PHENERGAN WITH CODEINE) 6 25-10 mg/5 mL syrup Take 5 mL by mouth every 4 (four) hours as needed for cough (Patient not taking: No sig reported) 120 mL 0   • Promethazine-DM (PHENERGAN-DM) 6 25-15 mg/5 mL oral syrup Take 5 mL by mouth 4 (four) times a day as needed for cough 120 mL 0   • SUMAtriptan (IMITREX) 100 mg tablet Take by mouth       No current facility-administered medications on file prior to visit  Objective   Vitals: Weight 98 9 kg (218 lb)  ,Body mass index is 35 19 kg/m²  PE:  AAOx 3  WDWN  Hearing intact, no drainage from eyes  Regular rate  no audible wheezing  no abdominal distension  LE compartments soft, skin intact    leftknee:    Appearance:  no swelling   No ecchymosis  no obvious joint deformity   No effusion  Palpation/Tenderness:  No TTP over medial joint line  No TTP over lateral joint line   No TTP over patella  No TTP over patellar tendon  + TTP over pes anserine bursa  Active Range of Motion:  AROM: 5-110  PROM: 0-115  Special Tests:  Medial Marisela's Test:  Negative  Lateral Marisela's Test:  Negative  Apley's compression test:  Negative  Lachman's Test:  negative  Anterior Drawer Test:  Negative  Patellar grind:  Negative  Valgus Stress Test:  negative  Varus Stress Test:  negative     No ipsilateral hip pain with ROM    leftLE:    Sensation grossly intact  Palpable pulse  AT/GS/EHL intact    RLE:  EHL/AT/GS/quads/hamstrings/iliopsoas 5/5, sensation grossly intact L4, L5, S1, palpable pedal pulse  LLE:  EHL/AT/GS/quads/hamstrings/iliopsoas 5/5, sensation grossly intact L4, L5, S1, palpable pedal pulse      Imaging Studies: I have personally reviewed pertinent films in PACS  XR leftknee:    Severe arthritis with joint space narrowing and osteophyte formation     Large joint arthrocentesis: L knee  Universal Protocol:  Consent: Verbal consent obtained    Risks and benefits: risks, benefits and alternatives were discussed  Consent given by: patient  Patient understanding: patient states understanding of the procedure being performed  Patient consent: the patient's understanding of the procedure matches consent given    Supporting Documentation  Indications: pain Procedure Details  Location: knee - L knee  Needle size: 22 G  Ultrasound guidance: no  Approach: anterolateral  Medications administered: 40 mg triamcinolone acetonide 40 mg/mL; 2 mL bupivacaine 0 25 %    Patient tolerance: patient tolerated the procedure well with no immediate complications  Dressing:  Sterile dressing applied            Scribe Attestation    I,:  Ish Mae am acting as a scribe while in the presence of the attending physician :       I,:  Inna Enrique DO personally performed the services described in this documentation    as scribed in my presence :

## 2023-07-19 DIAGNOSIS — M50.120 CERVICAL DISC DISORDER WITH RADICULOPATHY OF MID-CERVICAL REGION: ICD-10-CM

## 2023-07-19 DIAGNOSIS — M47.816 LUMBAR SPONDYLOSIS: ICD-10-CM

## 2023-07-20 DIAGNOSIS — M50.120 CERVICAL DISC DISORDER WITH RADICULOPATHY OF MID-CERVICAL REGION: ICD-10-CM

## 2023-07-20 DIAGNOSIS — M47.816 LUMBAR SPONDYLOSIS: ICD-10-CM

## 2023-07-27 ENCOUNTER — TELEPHONE (OUTPATIENT)
Dept: OBGYN CLINIC | Facility: HOSPITAL | Age: 68
End: 2023-07-27

## 2023-07-27 NOTE — TELEPHONE ENCOUNTER
Caller: Patient     Doctor: Nikki Finn    Reason for call: Patient is calling stating a few days ago her knee starting hurting again and she feels a ball on her kneecap. She is elevating and icing and nothing is helping. She would like advice on what she can do.     Call back#: [phone]

## 2023-07-28 NOTE — TELEPHONE ENCOUNTER
Irvin Lassiter, options would include gel injections which could be admin in another month. Or she could come in to discuss total knee replacement. She should continue to ice, elevate, and take tylenol up to 3000 mg per day. Please let me know that patient would like to do. Thanks!

## 2023-07-31 NOTE — TELEPHONE ENCOUNTER
Caller: patient     Doctor: Mary Jane Hawkins    Reason for call: pt returned the office phone call, she is asking can she take that amount of Tylenol along with the Diclofenac. She is also stated that sx is not an option at this time. Patient stated the lump came back under her knee cap. Pt is asking should she have another xray since the cyst came back.   She stated she would like to try the gel injections if that would be better than the cortisone    Call back#: [phone]

## 2023-08-03 DIAGNOSIS — M17.12 PRIMARY OSTEOARTHRITIS OF LEFT KNEE: Primary | ICD-10-CM

## 2023-08-03 NOTE — TELEPHONE ENCOUNTER
Spoke with patient. Will try VS. Injections ordered. Patient aware we will call to schedule. She may take diclofenac and tylenol.

## 2023-08-05 DIAGNOSIS — M50.120 CERVICAL DISC DISORDER WITH RADICULOPATHY OF MID-CERVICAL REGION: ICD-10-CM

## 2023-08-05 DIAGNOSIS — M47.816 LUMBAR SPONDYLOSIS: ICD-10-CM

## 2023-08-07 NOTE — TELEPHONE ENCOUNTER
Patient needs ov for refill with any provider.  Or she can ask if her PCP will take over the diclofenac

## 2023-08-08 NOTE — TELEPHONE ENCOUNTER
LVM to schedule ov with Timo Liu for med refill    Timo Liu has openings on 8/10 @7:45am or 8/14 @9:00am?

## 2023-08-27 DIAGNOSIS — M25.562 LEFT KNEE PAIN, UNSPECIFIED CHRONICITY: ICD-10-CM

## 2023-08-27 DIAGNOSIS — M17.12 PRIMARY OSTEOARTHRITIS OF LEFT KNEE: ICD-10-CM

## 2023-08-29 RX ORDER — DICLOFENAC SODIUM 75 MG/1
75 TABLET, DELAYED RELEASE ORAL 2 TIMES DAILY PRN
Qty: 60 TABLET | Refills: 2 | Status: SHIPPED | OUTPATIENT
Start: 2023-08-29

## 2023-09-01 DIAGNOSIS — M50.120 CERVICAL DISC DISORDER WITH RADICULOPATHY OF MID-CERVICAL REGION: ICD-10-CM

## 2023-09-01 DIAGNOSIS — M47.816 LUMBAR SPONDYLOSIS: ICD-10-CM
